# Patient Record
Sex: MALE | Race: WHITE | NOT HISPANIC OR LATINO
[De-identification: names, ages, dates, MRNs, and addresses within clinical notes are randomized per-mention and may not be internally consistent; named-entity substitution may affect disease eponyms.]

---

## 2022-01-21 ENCOUNTER — TRANSCRIPTION ENCOUNTER (OUTPATIENT)
Age: 57
End: 2022-01-21

## 2023-02-21 ENCOUNTER — OUTPATIENT (OUTPATIENT)
Dept: OUTPATIENT SERVICES | Facility: HOSPITAL | Age: 58
LOS: 1 days | End: 2023-02-21
Payer: COMMERCIAL

## 2023-02-21 DIAGNOSIS — F33.1 MAJOR DEPRESSIVE DISORDER, RECURRENT, MODERATE: ICD-10-CM

## 2023-02-21 DIAGNOSIS — I10 ESSENTIAL (PRIMARY) HYPERTENSION: ICD-10-CM

## 2023-02-21 DIAGNOSIS — G47.33 OBSTRUCTIVE SLEEP APNEA (ADULT) (PEDIATRIC): ICD-10-CM

## 2023-02-21 PROBLEM — Z00.00 ENCOUNTER FOR PREVENTIVE HEALTH EXAMINATION: Status: ACTIVE | Noted: 2023-02-21

## 2023-02-21 PROCEDURE — 80061 LIPID PANEL: CPT

## 2023-02-21 PROCEDURE — 84443 ASSAY THYROID STIM HORMONE: CPT

## 2023-02-21 PROCEDURE — 83036 HEMOGLOBIN GLYCOSYLATED A1C: CPT

## 2023-02-21 PROCEDURE — 85027 COMPLETE CBC AUTOMATED: CPT

## 2023-02-21 PROCEDURE — 80053 COMPREHEN METABOLIC PANEL: CPT

## 2023-02-21 PROCEDURE — 84153 ASSAY OF PSA TOTAL: CPT

## 2023-02-21 PROCEDURE — 36415 COLL VENOUS BLD VENIPUNCTURE: CPT

## 2023-05-10 ENCOUNTER — NON-APPOINTMENT (OUTPATIENT)
Age: 58
End: 2023-05-10

## 2023-05-10 ENCOUNTER — TRANSCRIPTION ENCOUNTER (OUTPATIENT)
Age: 58
End: 2023-05-10

## 2023-05-10 ENCOUNTER — APPOINTMENT (OUTPATIENT)
Dept: CARDIOLOGY | Facility: CLINIC | Age: 58
End: 2023-05-10
Payer: COMMERCIAL

## 2023-05-10 VITALS
HEIGHT: 71 IN | HEART RATE: 48 BPM | WEIGHT: 315 LBS | SYSTOLIC BLOOD PRESSURE: 104 MMHG | DIASTOLIC BLOOD PRESSURE: 63 MMHG | BODY MASS INDEX: 44.1 KG/M2 | OXYGEN SATURATION: 95 %

## 2023-05-10 DIAGNOSIS — Z78.9 OTHER SPECIFIED HEALTH STATUS: ICD-10-CM

## 2023-05-10 DIAGNOSIS — Z99.89 OBSTRUCTIVE SLEEP APNEA (ADULT) (PEDIATRIC): ICD-10-CM

## 2023-05-10 DIAGNOSIS — I49.3 VENTRICULAR PREMATURE DEPOLARIZATION: ICD-10-CM

## 2023-05-10 DIAGNOSIS — G47.33 OBSTRUCTIVE SLEEP APNEA (ADULT) (PEDIATRIC): ICD-10-CM

## 2023-05-10 PROCEDURE — 93000 ELECTROCARDIOGRAM COMPLETE: CPT

## 2023-05-10 PROCEDURE — 99205 OFFICE O/P NEW HI 60 MIN: CPT

## 2023-05-10 RX ORDER — FLUTICASONE FUROATE, UMECLIDINIUM BROMIDE AND VILANTEROL TRIFENATATE 100; 62.5; 25 UG/1; UG/1; UG/1
100-62.5-25 POWDER RESPIRATORY (INHALATION)
Refills: 0 | Status: ACTIVE | COMMUNITY
Start: 2023-05-10

## 2023-05-12 NOTE — DISCUSSION/SUMMARY
[FreeTextEntry1] : 57 year man with a history as listed presents for an initial cardiac evaluation. \par Fortunato is complaining of chronic MONTAG[place]. He was previously seeing Dr. Cruz. His symptoms are multifactorial in nature. Obesity is playing a large component of it. We spoke about possible GLP1 agonist in the future. \par He appears volume overloaded. I would change his lasix to torsemide. 20mg Q12 x 3 days and then down to 20mg Qday. He will get a 2d echo to assess for any  new structural heart disease, changes in valvular and ventricular function. \par Noted to have asymptomatic frequent PVCs. He had reportedly an negative ischemic evaluation within the last 2-3 years. I think that he will need a RHC and LHC. \par He will continue losartan\par Exercise and diet counseling was performed in order to reduce her future cardiovascular risk.\par He will followup with me in 1-2 months or sooner if necessary. \par  [EKG obtained to assist in diagnosis and management of assessed problem(s)] : EKG obtained to assist in diagnosis and management of assessed problem(s)

## 2023-05-12 NOTE — HISTORY OF PRESENT ILLNESS
[FreeTextEntry1] : 57 year old man with a history HTN, morbid obesity, right paralyzed hemidiaphragm, chronic lymphedema. HAKAN on CPAP presents for an initial cardiac evaluation. \par \par He was seeing Dr. Tadeo Cruz previously but not has changed insurance. He has been having long standing standing dyspnea on exertion.  He notes that his symptoms worsened 4 years ago after having COVID, PNA a couple of year ago. He notes that he walk about 100 feet and then has to stop 2/2 dyspnea. He notes mild chronic orthopnea. He   denies any chest pain, PND, near syncope, syncope, strokelike symptoms. His wife noted increase in abdominal girth. \par Medication reconciliation performed. He is compliant with his medications. \par \par

## 2023-05-12 NOTE — PHYSICAL EXAM
[Normal] : normal venous pressure, no carotid bruit [Normal Rate] : normal [Rhythm Regular] : regular [Normal S1] : normal S1 [Normal S2] : normal S2 [Distant] : the heart sounds were distant [No Murmur] : no murmurs heard [___ +] : bilateral [unfilled]U+ pretibial pitting edema [Rt] : varicose veins of the right leg noted [Lt] : varicose veins of the left leg noted [2+] : left 2+ [Clear Lung Fields] : clear lung fields [Good Air Entry] : good air entry [No Respiratory Distress] : no respiratory distress  [Soft] : abdomen soft [Non Tender] : non-tender [Normal Gait] : normal gait [No Cyanosis] : no cyanosis [No Clubbing] : no clubbing [No Rash] : no rash [No Skin Lesions] : no skin lesions [Moves all extremities] : moves all extremities [No Focal Deficits] : no focal deficits [Alert and Oriented] : alert and oriented [Normal memory] : normal memory [Right Carotid Bruit] : no bruit heard over the right carotid [Left Carotid Bruit] : no bruit heard over the left carotid

## 2023-05-12 NOTE — CARDIOLOGY SUMMARY
[de-identified] : Sinus  Rhythm  -Frequent pvcs -ventricular trigeminy \par -  Nonspecific T-abnormality. \par  Low voltage with rightward P-axis and rotation -possible pulmonary disease. \par  [de-identified] : 2021 ([facility]) small defect presents in the mid anterolateral will may be 2/2 artifact but ischemia cannot be ruled

## 2023-05-16 ENCOUNTER — APPOINTMENT (OUTPATIENT)
Dept: CARDIOLOGY | Facility: CLINIC | Age: 58
End: 2023-05-16
Payer: COMMERCIAL

## 2023-05-16 ENCOUNTER — MED ADMIN CHARGE (OUTPATIENT)
Age: 58
End: 2023-05-16

## 2023-05-16 PROCEDURE — 93306 TTE W/DOPPLER COMPLETE: CPT

## 2023-05-16 RX ORDER — PERFLUTREN 6.52 MG/ML
6.52 INJECTION, SUSPENSION INTRAVENOUS
Qty: 1 | Refills: 0 | Status: COMPLETED | OUTPATIENT
Start: 2023-05-16

## 2023-05-16 RX ADMIN — PERFLUTREN MG/ML: 6.52 INJECTION, SUSPENSION INTRAVENOUS at 00:00

## 2023-06-13 ENCOUNTER — NON-APPOINTMENT (OUTPATIENT)
Age: 58
End: 2023-06-13

## 2023-06-14 ENCOUNTER — APPOINTMENT (OUTPATIENT)
Dept: CARDIOLOGY | Facility: CLINIC | Age: 58
End: 2023-06-14
Payer: COMMERCIAL

## 2023-06-14 ENCOUNTER — NON-APPOINTMENT (OUTPATIENT)
Age: 58
End: 2023-06-14

## 2023-06-14 VITALS
HEART RATE: 51 BPM | BODY MASS INDEX: 44.1 KG/M2 | SYSTOLIC BLOOD PRESSURE: 117 MMHG | DIASTOLIC BLOOD PRESSURE: 77 MMHG | HEIGHT: 71 IN | WEIGHT: 315 LBS | OXYGEN SATURATION: 97 %

## 2023-06-14 PROCEDURE — 99215 OFFICE O/P EST HI 40 MIN: CPT

## 2023-06-14 PROCEDURE — 93000 ELECTROCARDIOGRAM COMPLETE: CPT

## 2023-06-14 NOTE — HISTORY OF PRESENT ILLNESS
[FreeTextEntry1] : 57 year old man with a history HTN, morbid obesity, right paralyzed hemidiaphragm, chronic lymphedema. HAKAN on CPAP presents for an initial cardiac evaluation. \par \par Since his last visit, he is feeling much better. He notes that his exercise tolerance has increased. He is able to able to walk about 150 feet and his recovery has improved. . He notes mild chronic orthopnea. He   denies any chest pain, PND, near syncope, syncope, strokelike symptoms. he has noted a mild decrease in abdominal girth. \par Medication reconciliation performed. He is compliant with his medications. \par \par

## 2023-06-14 NOTE — DISCUSSION/SUMMARY
[FreeTextEntry1] : 58 year man with a history as listed [facility]. \par Fortunato feels like he has dramatically improved on the torsemide. He is taking it in the afternoon because of brisk diuresis. He will try to take the Torsemide 20mg q12 three times a week. \par Noted to have asymptomatic frequent PVCs which has resolved.  Given is new HF he will need a RHC and LHC. \par  Obesity is playing a large component of it. We spoke about possible GLP1 agonist in the future. \par He appears volume overloaded.  \par \par He will continue losartan\par Exercise and diet counseling was performed in order to reduce her future cardiovascular risk.\par He will followup with me in 2 months or sooner if necessary. \par  [EKG obtained to assist in diagnosis and management of assessed problem(s)] : EKG obtained to assist in diagnosis and management of assessed problem(s)

## 2023-06-14 NOTE — CARDIOLOGY SUMMARY
[de-identified] : Sinus  Bradycardia \par Low voltage with rightward P-axis and rotation -possible pulmonary disease.\par  [de-identified] : 2021 ([facility]) small defect presents in the mid anterolateral will may be 2/2 artifact but ischemia cannot be ruled  [de-identified] : 5/17/23 normal LV function, mild LVH

## 2023-06-30 ENCOUNTER — TRANSCRIPTION ENCOUNTER (OUTPATIENT)
Age: 58
End: 2023-06-30

## 2023-07-07 ENCOUNTER — TRANSCRIPTION ENCOUNTER (OUTPATIENT)
Age: 58
End: 2023-07-07

## 2023-07-07 ENCOUNTER — INPATIENT (INPATIENT)
Facility: HOSPITAL | Age: 58
LOS: 0 days | Discharge: HOME | DRG: 247 | End: 2023-07-08
Attending: STUDENT IN AN ORGANIZED HEALTH CARE EDUCATION/TRAINING PROGRAM | Admitting: STUDENT IN AN ORGANIZED HEALTH CARE EDUCATION/TRAINING PROGRAM
Payer: COMMERCIAL

## 2023-07-07 VITALS
RESPIRATION RATE: 26 BRPM | OXYGEN SATURATION: 88 % | SYSTOLIC BLOOD PRESSURE: 114 MMHG | TEMPERATURE: 98 F | DIASTOLIC BLOOD PRESSURE: 63 MMHG | HEART RATE: 55 BPM | HEIGHT: 70 IN | WEIGHT: 315 LBS

## 2023-07-07 DIAGNOSIS — I50.9 HEART FAILURE, UNSPECIFIED: ICD-10-CM

## 2023-07-07 DIAGNOSIS — Z87.898 PERSONAL HISTORY OF OTHER SPECIFIED CONDITIONS: ICD-10-CM

## 2023-07-07 DIAGNOSIS — I10 ESSENTIAL (PRIMARY) HYPERTENSION: ICD-10-CM

## 2023-07-07 DIAGNOSIS — Z90.49 ACQUIRED ABSENCE OF OTHER SPECIFIED PARTS OF DIGESTIVE TRACT: Chronic | ICD-10-CM

## 2023-07-07 DIAGNOSIS — E66.01 MORBID (SEVERE) OBESITY DUE TO EXCESS CALORIES: ICD-10-CM

## 2023-07-07 DIAGNOSIS — I89.0 LYMPHEDEMA, NOT ELSEWHERE CLASSIFIED: ICD-10-CM

## 2023-07-07 DIAGNOSIS — Z98.890 OTHER SPECIFIED POSTPROCEDURAL STATES: ICD-10-CM

## 2023-07-07 DIAGNOSIS — G47.33 OBSTRUCTIVE SLEEP APNEA (ADULT) (PEDIATRIC): ICD-10-CM

## 2023-07-07 DIAGNOSIS — Z29.9 ENCOUNTER FOR PROPHYLACTIC MEASURES, UNSPECIFIED: ICD-10-CM

## 2023-07-07 LAB
ANION GAP SERPL CALC-SCNC: 6 MMOL/L — SIGNIFICANT CHANGE UP (ref 5–17)
BUN SERPL-MCNC: 19 MG/DL — SIGNIFICANT CHANGE UP (ref 7–23)
CALCIUM SERPL-MCNC: 9 MG/DL — SIGNIFICANT CHANGE UP (ref 8.5–10.1)
CHLORIDE SERPL-SCNC: 107 MMOL/L — SIGNIFICANT CHANGE UP (ref 96–108)
CO2 SERPL-SCNC: 28 MMOL/L — SIGNIFICANT CHANGE UP (ref 22–31)
CREAT SERPL-MCNC: 1.1 MG/DL — SIGNIFICANT CHANGE UP (ref 0.5–1.3)
EGFR: 78 ML/MIN/1.73M2 — SIGNIFICANT CHANGE UP
GLUCOSE SERPL-MCNC: 117 MG/DL — HIGH (ref 70–99)
HCT VFR BLD CALC: 44.1 % — SIGNIFICANT CHANGE UP (ref 39–50)
HGB BLD-MCNC: 14.7 G/DL — SIGNIFICANT CHANGE UP (ref 13–17)
MCHC RBC-ENTMCNC: 29.8 PG — SIGNIFICANT CHANGE UP (ref 27–34)
MCHC RBC-ENTMCNC: 33.3 GM/DL — SIGNIFICANT CHANGE UP (ref 32–36)
MCV RBC AUTO: 89.3 FL — SIGNIFICANT CHANGE UP (ref 80–100)
NRBC # BLD: 0 /100 WBCS — SIGNIFICANT CHANGE UP (ref 0–0)
PLATELET # BLD AUTO: 145 K/UL — LOW (ref 150–400)
POTASSIUM SERPL-MCNC: 3.8 MMOL/L — SIGNIFICANT CHANGE UP (ref 3.5–5.3)
POTASSIUM SERPL-SCNC: 3.8 MMOL/L — SIGNIFICANT CHANGE UP (ref 3.5–5.3)
RBC # BLD: 4.94 M/UL — SIGNIFICANT CHANGE UP (ref 4.2–5.8)
RBC # FLD: 13.1 % — SIGNIFICANT CHANGE UP (ref 10.3–14.5)
SODIUM SERPL-SCNC: 141 MMOL/L — SIGNIFICANT CHANGE UP (ref 135–145)
WBC # BLD: 9.05 K/UL — SIGNIFICANT CHANGE UP (ref 3.8–10.5)
WBC # FLD AUTO: 9.05 K/UL — SIGNIFICANT CHANGE UP (ref 3.8–10.5)

## 2023-07-07 PROCEDURE — 93571 IV DOP VEL&/PRESS C FLO 1ST: CPT | Mod: 26,52,LD

## 2023-07-07 PROCEDURE — 99152 MOD SED SAME PHYS/QHP 5/>YRS: CPT

## 2023-07-07 PROCEDURE — 92978 ENDOLUMINL IVUS OCT C 1ST: CPT | Mod: 26,LD

## 2023-07-07 PROCEDURE — 92928 PRQ TCAT PLMT NTRAC ST 1 LES: CPT | Mod: LD

## 2023-07-07 PROCEDURE — 93460 R&L HRT ART/VENTRICLE ANGIO: CPT | Mod: 26,59

## 2023-07-07 PROCEDURE — 93010 ELECTROCARDIOGRAM REPORT: CPT | Mod: 76

## 2023-07-07 RX ORDER — LOSARTAN POTASSIUM 100 MG/1
100 TABLET, FILM COATED ORAL DAILY
Refills: 0 | Status: DISCONTINUED | OUTPATIENT
Start: 2023-07-07 | End: 2023-07-08

## 2023-07-07 RX ORDER — CLOPIDOGREL BISULFATE 75 MG/1
75 TABLET, FILM COATED ORAL
Refills: 0 | Status: DISCONTINUED | OUTPATIENT
Start: 2023-07-08 | End: 2023-07-08

## 2023-07-07 RX ORDER — ASPIRIN/CALCIUM CARB/MAGNESIUM 324 MG
81 TABLET ORAL
Refills: 0 | Status: DISCONTINUED | OUTPATIENT
Start: 2023-07-08 | End: 2023-07-08

## 2023-07-07 RX ORDER — FUROSEMIDE 40 MG
40 TABLET ORAL ONCE
Refills: 0 | Status: COMPLETED | OUTPATIENT
Start: 2023-07-07 | End: 2023-07-07

## 2023-07-07 RX ORDER — FLUTICASONE FUROATE, UMECLIDINIUM BROMIDE AND VILANTEROL TRIFENATATE 200; 62.5; 25 UG/1; UG/1; UG/1
1 POWDER RESPIRATORY (INHALATION)
Refills: 0 | DISCHARGE

## 2023-07-07 RX ORDER — LOSARTAN POTASSIUM 100 MG/1
1 TABLET, FILM COATED ORAL
Refills: 0 | DISCHARGE

## 2023-07-07 RX ORDER — ASPIRIN/CALCIUM CARB/MAGNESIUM 324 MG
1 TABLET ORAL
Qty: 90 | Refills: 3
Start: 2023-07-07 | End: 2024-06-30

## 2023-07-07 RX ORDER — ATORVASTATIN CALCIUM 80 MG/1
1 TABLET, FILM COATED ORAL
Qty: 90 | Refills: 3
Start: 2023-07-07 | End: 2024-06-30

## 2023-07-07 RX ORDER — CLOPIDOGREL BISULFATE 75 MG/1
1 TABLET, FILM COATED ORAL
Qty: 90 | Refills: 3
Start: 2023-07-07 | End: 2024-06-30

## 2023-07-07 RX ORDER — ATORVASTATIN CALCIUM 80 MG/1
40 TABLET, FILM COATED ORAL AT BEDTIME
Refills: 0 | Status: DISCONTINUED | OUTPATIENT
Start: 2023-07-07 | End: 2023-07-08

## 2023-07-07 RX ADMIN — Medication 40 MILLIGRAM(S): at 15:43

## 2023-07-07 RX ADMIN — ATORVASTATIN CALCIUM 40 MILLIGRAM(S): 80 TABLET, FILM COATED ORAL at 21:10

## 2023-07-07 NOTE — CONSULT NOTE ADULT - SUBJECTIVE AND OBJECTIVE BOX
ANIL MCKNIGHT  58y  Male    Patient is a 58y old  Male referred for right and left heart catheterization. Patient presented for right and left heart catheterization to [facility] for pulm HTN and heart failure. He is feeling well and SOB has improved since he  was switched from lasix to torsemide. Patient went for right and left heart catheterization today,  S/P cardiac cath 7/7/23   Denies CP, SOB, palpitations, N/V, fever/chills, abd pain, numbness/tingling/weakness, other c/o at this time.        PAST MEDICAL/SURGICAL HISTORY  PAST MEDICAL & SURGICAL HISTORY:  HTN (hypertension)      Lymphedema of both lower extremities      Morbid obesity      Paralyzed hemidiaphragm      HAKAN on CPAP      Prediabetes      PNA (pneumonia)      History of cholecystectomy          REVIEW OF SYSTEMS:  CONSTITUTIONAL: No fever, weight loss, or fatigue  EYES: No eye pain, visual disturbances, or discharge  ENMT:  No difficulty hearing, tinnitus, vertigo; No sinus or throat pain  NECK: No pain or stiffness  BREASTS: No pain, masses, or nipple discharge  RESPIRATORY: No cough, wheezing, chills or hemoptysis; No shortness of breath  CARDIOVASCULAR: No chest pain, palpitations, dizziness, or leg swelling  GASTROINTESTINAL: No abdominal or epigastric pain. No nausea, vomiting, or hematemesis; No diarrhea or constipation. No melena or hematochezia.  GENITOURINARY: No dysuria, frequency, hematuria, or incontinence  NEUROLOGICAL: No headaches, memory loss, loss of strength, numbness, or tremors  SKIN: No itching, burning, rashes, or lesions   MUSCULOSKELETAL: No joint pain or swelling; No muscle, back, or extremity pain  PSYCHIATRIC: No depression, anxiety, mood swings, or difficulty sleeping        T(C): 36.7 (07-07-23 @ 11:02), Max: 36.7 (07-07-23 @ 11:02)  HR: 55 (07-07-23 @ 11:02) (55 - 55)  BP: 114/63 (07-07-23 @ 11:02) (114/63 - 114/63)  RR: 26 (07-07-23 @ 11:02) (26 - 26)  SpO2: 88% (07-07-23 @ 11:02) (88% - 88%)  Wt(kg): --Vital Signs Last 24 Hrs  T(C): 36.7 (07 Jul 2023 11:02), Max: 36.7 (07 Jul 2023 11:02)  T(F): 98.1 (07 Jul 2023 11:02), Max: 98.1 (07 Jul 2023 11:02)  HR: 55 (07 Jul 2023 11:02) (55 - 55)  BP: 114/63 (07 Jul 2023 11:02) (114/63 - 114/63)  BP(mean): --  RR: 26 (07 Jul 2023 11:02) (26 - 26)  SpO2: 88% (07 Jul 2023 11:02) (88% - 88%)        PHYSICAL EXAM:  GENERAL: NAD, well-groomed, well-developed  HEAD:  Atraumatic, Normocephalic  EYES: EOMI, PERRLA, conjunctiva and sclera clear  ENMT: No tonsillar erythema, exudates, or enlargement; Moist mucous membranes, Good dentition, No lesions  NECK: Supple, No JVD, Normal thyroid  NERVOUS SYSTEM:  Alert & Oriented X3, Good concentration; Motor Strength 5/5 B/L upper and lower extremities; DTRs 2+ intact and symmetric  CHEST/LUNG: Clear to percussion bilaterally; No rales, rhonchi, wheezing, or rubs  HEART: Regular rate and rhythm; No murmurs, rubs, or gallops  ABDOMEN: Soft, Nontender, Nondistended; Bowel sounds present  EXTREMITIES:  2+ Peripheral Pulses, No clubbing, cyanosis, or edema  LYMPH: No lymphadenopathy noted  SKIN: No rashes or lesions    Consultant(s) Notes Reviewed:  [x ] YES  [ ] NO  Care Discussed with Consultants/Other Providers [ x] YES  [ ] NO    LABS:  CBC   07-07-23 @ 10:50  Hematcorit 44.1  Hemoglobin 14.7  Mean Cell Hemoglobin 29.8  Platelet Count-Automated 145  RBC Count 4.94  Red Cell Distrib Width 13.1  Wbc Count 9.05      BMP  07-07-23 @ 10:50  Anion Gap. Serum 6  Blood Urea Nitrogen,Serm 19  Calcium, Total Serum 9.0  Carbon Dioxide, Serum 28  Chloride, Serum 107  Creatinine, Serum 1.10  eGFR in  --  eGFR in Non Afican American --  Gloucose, serum 117  Potassium, Serum 3.8  Sodium, Serum 141    CMP  07-07-23 @ 10:50  Lorna Aminotransferase(ALT/SGPT)--  Albumin, Serum --  Alkaline Phosphatase, Serum --  Anion Gap, Serum 6  Aspartate Aminotransferase (AST/SGOT)--  Bilirubin Total, Serum --  Blood Urea Nitrogen, Serum 19  Calcium,Total Serum 9.0  Carbon Dioxide, Serum 28  Chloride, Serum 107  Creatinine, Serum 1.10  eGFR if  --  eGFR if Non African American --  Glucose, Serum 117  Potassium, Serum 3.8  Protein Total, Serum --  Sodium, Serum 141      PT/INR      Amylase/Lipase    RADIOLOGY & ADDITIONAL TESTS:      Imaging Personally Reviewed:  [ ] YES  [ ] NO ANIL MCKNIGHT  58y  Male    Patient is a 58y old  Male referred for right and left heart catheterization. Patient presented for right and left heart catheterization to [facility] for pulm HTN and heart failure. He is feeling well and SOB has improved since he  was switched from lasix to torsemide. Patient went for right and left heart catheterization today,  S/P cardiac cath 7/7/23 w/ EFRAÍN x1 to 70% mLAD, Lasix 40mg IV x 1 for LVEDP 35.     Denies CP, SOB, palpitations, N/V, fever/chills, abd pain, numbness/tingling/weakness, other c/o at this time.        PAST MEDICAL/SURGICAL HISTORY  PAST MEDICAL & SURGICAL HISTORY:  HTN (hypertension)      Lymphedema of both lower extremities      Morbid obesity      Paralyzed hemidiaphragm      HAKAN on CPAP      Prediabetes      PNA (pneumonia)      History of cholecystectomy          REVIEW OF SYSTEMS:  CONSTITUTIONAL: No fever, weight loss, or fatigue  EYES: No eye pain, visual disturbances, or discharge  ENMT:  No difficulty hearing, tinnitus, vertigo; No sinus or throat pain  NECK: No pain or stiffness  BREASTS: No pain, masses, or nipple discharge  RESPIRATORY: No cough, wheezing, chills or hemoptysis; No shortness of breath  CARDIOVASCULAR: No pain, palpitations, dizziness.  GASTROINTESTINAL: No abdominal or epigastric pain. No nausea, vomiting, or hematemesis; No diarrhea or constipation. No melena or hematochezia.  GENITOURINARY: No dysuria, frequency, hematuria, or incontinence  NEUROLOGICAL: No headaches, memory loss, loss of strength, numbness, or tremors  SKIN: No itching, burning, rashes, or lesions   MUSCULOSKELETAL: No joint pain or swelling; No muscle, back, or extremity pain  PSYCHIATRIC: No depression, anxiety, mood swings, or difficulty sleeping        T(C): 36.7 (07-07-23 @ 11:02), Max: 36.7 (07-07-23 @ 11:02)  HR: 55 (07-07-23 @ 11:02) (55 - 55)  BP: 114/63 (07-07-23 @ 11:02) (114/63 - 114/63)  RR: 26 (07-07-23 @ 11:02) (26 - 26)  SpO2: 88% (07-07-23 @ 11:02) (88% - 88%)  Wt(kg): --Vital Signs Last 24 Hrs  T(C): 36.7 (07 Jul 2023 11:02), Max: 36.7 (07 Jul 2023 11:02)  T(F): 98.1 (07 Jul 2023 11:02), Max: 98.1 (07 Jul 2023 11:02)  HR: 55 (07 Jul 2023 11:02) (55 - 55)  BP: 114/63 (07 Jul 2023 11:02) (114/63 - 114/63)  BP(mean): --  RR: 26 (07 Jul 2023 11:02) (26 - 26)  SpO2: 88% (07 Jul 2023 11:02) (88% - 88%)        PHYSICAL EXAM:  GENERAL: NAD, well-groomed, well-developed  HEAD:  Atraumatic, Normocephalic  EYES: EOMI, PERRLA, conjunctiva and sclera clear  ENMT: No tonsillar erythema, exudates, or enlargement; Moist mucous membranes, Good dentition, No lesions  NECK: Supple, No JVD, Normal thyroid  NERVOUS SYSTEM:  Alert & Oriented X3, Good concentration; Motor Strength 5/5 B/L upper and lower extremities; DTRs 2+ intact and symmetric  CHEST/LUNG: CTA bilaterally. Decrased BS BL 2/2 body habitus. No rales, rhonchi, wheezing, or rubs  HEART: Regular rate and rhythm; No murmurs, rubs, or gallops  ABDOMEN: Soft, Nontender, Nondistended; Bowel sounds present  EXTREMITIES:  +b/l lymphedema L>R. No clubbing or cyanosis    Consultant(s) Notes Reviewed:  [x ] YES  [ ] NO  Care Discussed with Consultants/Other Providers [ x] YES  [ ] NO    LABS:  CBC   07-07-23 @ 10:50  Hematcorit 44.1  Hemoglobin 14.7  Mean Cell Hemoglobin 29.8  Platelet Count-Automated 145  RBC Count 4.94  Red Cell Distrib Width 13.1  Wbc Count 9.05      BMP  07-07-23 @ 10:50  Anion Gap. Serum 6  Blood Urea Nitrogen,Serm 19  Calcium, Total Serum 9.0  Carbon Dioxide, Serum 28  Chloride, Serum 107  Creatinine, Serum 1.10  eGFR in  --  eGFR in Non Afican American --  Gloucose, serum 117  Potassium, Serum 3.8  Sodium, Serum 141    CMP  07-07-23 @ 10:50  Lorna Aminotransferase(ALT/SGPT)--  Albumin, Serum --  Alkaline Phosphatase, Serum --  Anion Gap, Serum 6  Aspartate Aminotransferase (AST/SGOT)--  Bilirubin Total, Serum --  Blood Urea Nitrogen, Serum 19  Calcium,Total Serum 9.0  Carbon Dioxide, Serum 28  Chloride, Serum 107  Creatinine, Serum 1.10  eGFR if  --  eGFR if Non African American --  Glucose, Serum 117  Potassium, Serum 3.8  Protein Total, Serum --  Sodium, Serum 141      PT/INR      Amylase/Lipase    RADIOLOGY & ADDITIONAL TESTS:      Imaging Personally Reviewed:  [ ] YES  [ ] NO ANIL MCKNIGHT  58y  Male    Patient is a 58y old  Male referred for right and left heart catheterization. Patient presented for right and left heart catheterization to [facility] for pulm HTN and heart failure. He is feeling well and SOB has improved since he was switched from lasix to torsemide. Patient went for right and left heart catheterization today,  S/P cardiac cath 7/7/23 w/ EFRAÍN x1 to 70% mLAD, Lasix 40mg IV x 1 for LVEDP 35.     Denies CP, SOB, palpitations, N/V, fever/chills, abd pain, numbness/tingling/weakness, other c/o at this time.        PAST MEDICAL/SURGICAL HISTORY  PAST MEDICAL & SURGICAL HISTORY:  HTN (hypertension)      Lymphedema of both lower extremities      Morbid obesity      Paralyzed hemidiaphragm      HAKAN on CPAP      Prediabetes      PNA (pneumonia)      History of cholecystectomy          REVIEW OF SYSTEMS:  CONSTITUTIONAL: No fever, weight loss, or fatigue  EYES: No eye pain, visual disturbances, or discharge  ENMT:  No difficulty hearing, tinnitus, vertigo; No sinus or throat pain  NECK: No pain or stiffness  BREASTS: No pain, masses, or nipple discharge  RESPIRATORY: No cough, wheezing, chills or hemoptysis; No shortness of breath  CARDIOVASCULAR: No pain, palpitations, dizziness.  GASTROINTESTINAL: No abdominal or epigastric pain. No nausea, vomiting, or hematemesis; No diarrhea or constipation. No melena or hematochezia.  GENITOURINARY: No dysuria, frequency, hematuria, or incontinence  NEUROLOGICAL: No headaches, memory loss, loss of strength, numbness, or tremors  SKIN: No itching, burning, rashes, or lesions   MUSCULOSKELETAL: No joint pain or swelling; No muscle, back, or extremity pain  PSYCHIATRIC: No depression, anxiety, mood swings, or difficulty sleeping        T(C): 36.7 (07-07-23 @ 11:02), Max: 36.7 (07-07-23 @ 11:02)  HR: 55 (07-07-23 @ 11:02) (55 - 55)  BP: 114/63 (07-07-23 @ 11:02) (114/63 - 114/63)  RR: 26 (07-07-23 @ 11:02) (26 - 26)  SpO2: 88% (07-07-23 @ 11:02) (88% - 88%)  Wt(kg): --Vital Signs Last 24 Hrs  T(C): 36.7 (07 Jul 2023 11:02), Max: 36.7 (07 Jul 2023 11:02)  T(F): 98.1 (07 Jul 2023 11:02), Max: 98.1 (07 Jul 2023 11:02)  HR: 55 (07 Jul 2023 11:02) (55 - 55)  BP: 114/63 (07 Jul 2023 11:02) (114/63 - 114/63)  BP(mean): --  RR: 26 (07 Jul 2023 11:02) (26 - 26)  SpO2: 88% (07 Jul 2023 11:02) (88% - 88%)        PHYSICAL EXAM:  GENERAL: NAD, well-groomed, well-developed  HEAD:  Atraumatic, Normocephalic  EYES: EOMI, PERRLA, conjunctiva and sclera clear  ENMT: No tonsillar erythema, exudates, or enlargement; Moist mucous membranes, Good dentition, No lesions  NECK: Supple, No JVD, Normal thyroid  NERVOUS SYSTEM:  Alert & Oriented X3, Good concentration; Motor Strength 5/5 B/L upper and lower extremities; DTRs 2+ intact and symmetric  CHEST/LUNG: CTA bilaterally. Decrased BS BL 2/2 body habitus. No rales, rhonchi, wheezing, or rubs  HEART: Regular rate and rhythm; No murmurs, rubs, or gallops  ABDOMEN: Soft, Nontender, Nondistended; Bowel sounds present  EXTREMITIES:  +b/l lymphedema L>R. No clubbing or cyanosis    Consultant(s) Notes Reviewed:  [x ] YES  [ ] NO  Care Discussed with Consultants/Other Providers [ x] YES  [ ] NO    LABS:  CBC   07-07-23 @ 10:50  Hematcorit 44.1  Hemoglobin 14.7  Mean Cell Hemoglobin 29.8  Platelet Count-Automated 145  RBC Count 4.94  Red Cell Distrib Width 13.1  Wbc Count 9.05      BMP  07-07-23 @ 10:50  Anion Gap. Serum 6  Blood Urea Nitrogen,Serm 19  Calcium, Total Serum 9.0  Carbon Dioxide, Serum 28  Chloride, Serum 107  Creatinine, Serum 1.10  eGFR in  --  eGFR in Non Afican American --  Gloucose, serum 117  Potassium, Serum 3.8  Sodium, Serum 141    CMP  07-07-23 @ 10:50  Lorna Aminotransferase(ALT/SGPT)--  Albumin, Serum --  Alkaline Phosphatase, Serum --  Anion Gap, Serum 6  Aspartate Aminotransferase (AST/SGOT)--  Bilirubin Total, Serum --  Blood Urea Nitrogen, Serum 19  Calcium,Total Serum 9.0  Carbon Dioxide, Serum 28  Chloride, Serum 107  Creatinine, Serum 1.10  eGFR if  --  eGFR if Non African American --  Glucose, Serum 117  Potassium, Serum 3.8  Protein Total, Serum --  Sodium, Serum 141      PT/INR      Amylase/Lipase    RADIOLOGY & ADDITIONAL TESTS:      Imaging Personally Reviewed:  [ ] YES  [ ] NO

## 2023-07-07 NOTE — H&P CARDIOLOGY - HISTORY OF PRESENT ILLNESS
58 year old male with no implantable cardiac devices and PMH chronic hereditary lymphedema, paralyzed R diaphragm, PNA, HTN, morbid obesity, HAKAN on CPAP, prediabetes (A1c 6.0).     6/14/23 seen by Dr Rojas Rodriguez:  Since his last visit, he is feeling much better. He notes that his exercise tolerance has increased. He is able to able to walk about 150 feet and his recovery has improved. . He notes mild chronic orthopnea. He denies any chest pain, PND, near syncope, syncope, strokelike symptoms. he has noted a mild decrease in abdominal girth. Medication reconciliation performed. He is compliant with his medications.      Cardiology Summary    ECG: Sinus Bradycardia   Low voltage with rightward P-axis and rotation -possible pulmonary disease.     Stress Test: 2021 ([facility]) small defect presents in the mid anterolateral will may be 2/2 artifact but ischemia cannot be ruled   Echo: 5/17/23 normal LV function, mild LVH      7/7/23 pt presents today for right and left heart catheterization to eval for pulm HTN and heart failure. He is feeling well and SOB has improved since he was switched from lasix to torsemide. Denies CP, SOB, palpitations, N/V, fever/chills, abd pain, numbness/tingling/weakness, other c/o at this time.    58 year old male with no implantable cardiac devices and PMH chronic hereditary lymphedema, paralyzed R diaphragm, PNA, HTN, morbid obesity, HAKAN on CPAP, prediabetes (A1c 6.0).     6/14/23 seen by Dr Rojas Rodriguez:  Since his last visit, he is feeling much better. He notes that his exercise tolerance has increased. He is able to able to walk about 150 feet and his recovery has improved. . He notes mild chronic orthopnea. He denies any chest pain, PND, near syncope, syncope, strokelike symptoms. he has noted a mild decrease in abdominal girth. Medication reconciliation performed. He is compliant with his medications.      Cardiology Summary    ECG: Sinus Bradycardia   Low voltage with rightward P-axis and rotation -possible pulmonary disease.     Stress Test: 2021 ([facility]) small defect presents in the mid anterolateral will may be 2/2 artifact but ischemia cannot be ruled   Echo: 5/17/23 normal LV function, mild LVH      7/7/23 pt presents today for right and left heart catheterization to eval for pulm HTN and heart failure. He is feeling well and SOB has improved since he was switched from lasix to torsemide. Denies CP, SOB, palpitations, N/V, fever/chills, abd pain, numbness/tingling/weakness, other c/o at this time.     Adjusted CathPCI Bleeding Event Risk: 0.7%

## 2023-07-07 NOTE — DISCHARGE NOTE PROVIDER - CARE PROVIDER_API CALL
Jade Urias  [address] [place] [id number]  Phone: [phone]  Fax: (   )    -  Scheduled Appointment: 07/14/2023    Rojas Rodriguez  Cardiology  [address] [place] [address]  Phone: [phone]  Fax: [phone]  Follow Up Time: 2 weeks   Rojas Rodriguez  Cardiology  [address] [place] [address]  Phone: [phone]  Fax: [phone]  Follow Up Time: 2 weeks    Jade Urias  [address] [place] [address]  Phone: [phone]  Fax: (   )    -  Scheduled Appointment: 07/14/2023 10:15 AM

## 2023-07-07 NOTE — H&P CARDIOLOGY - NSICDXPASTMEDICALHX_GEN_ALL_CORE_FT
PAST MEDICAL HISTORY:  HTN (hypertension)     Lymphedema of both lower extremities     Morbid obesity     HAKAN on CPAP     Paralyzed hemidiaphragm     PNA (pneumonia)     Prediabetes

## 2023-07-07 NOTE — DISCHARGE NOTE PROVIDER - NSDCCPTREATMENT_GEN_ALL_CORE_FT
PRINCIPAL PROCEDURE  Procedure: Left heart catheterization  Findings and Treatment:       SECONDARY PROCEDURE  Procedure: Right heart catheterization  Findings and Treatment:     Procedure: Insertion, stent, drug eluting, coronary artery, single  Findings and Treatment: mLAD 7/7/23 (70% stenosis, iFR 0.89%)

## 2023-07-07 NOTE — CONSULT NOTE ADULT - PROBLEM SELECTOR RECOMMENDATION 5
prediabetes (A1c 6.0)  blood sugar 117   continue monitor blood sugar prediabetes (A1c 6.0)  -blood sugar 117   -continue monitor blood sugar

## 2023-07-07 NOTE — CONSULT NOTE ADULT - ASSESSMENT
58 year old male with no implantable cardiac devices and PMH chronic hereditary lymphedema, paralyzed R diaphragm, PNA, HTN, morbid obesity, HAKAN on CPAP, prediabetes (A1c 6.0). s/p cardiac cath 7/723, here for post cath observation and close monitoring  58 year old male with no implantable cardiac devices and PMH chronic hereditary lymphedema, paralyzed R diaphragm, PNA, HTN, morbid obesity, HAKAN on CPAP, prediabetes (A1c 6.0). Patient s/p right and left heart catheterization s/p mLAD CAD, s/p EFRAÍN x 1 on 7/7/2023, admitted for post cath observation and close monitoring

## 2023-07-07 NOTE — DISCHARGE NOTE PROVIDER - PROVIDER TOKENS
FREE:[LAST:[[employer]],FIRST:[Jade],PHONE:[phone]],FAX:[(   )    -],ADDRESS:[[address] [place] [address]],SCHEDULEDAPPT:[07/14/2023]],PROVIDER:[TOKEN:[[id number]],FOLLOWUP:[2 weeks]] PROVIDER:[TOKEN:[[id number]],FOLLOWUP:[2 weeks]],FREE:[LAST:[Boutis],FIRST:[Jade],PHONE:[phone]],FAX:[(   )    -],ADDRESS:[[address] [place] [address]],SCHEDULEDAPPT:[07/14/2023],SCHEDULEDAPPTTIME:[10:15 AM]]

## 2023-07-07 NOTE — CHART NOTE - NSCHARTNOTEFT_GEN_A_CORE
Department of Cardiology                                                                     Division of Interventional Cardiology                                                                  [facility]/ [place] [address]                                                                             Telephone: [phone]                                                    Post- Procedure Note: Right & Left Heart Cardiac Catheterization       58y  Male is now s/p left heart catheterization    Subjective/ROS: no c/o offered  Denies CP, palpitations, SOB, N/V, fever/chills, dizziness, weakness, numbness/tingling.      HPI:  58 year old male with no implantable cardiac devices and PMH chronic hereditary lymphedema, paralyzed R diaphragm, PNA, HTN, morbid obesity, HAKAN on CPAP, prediabetes (A1c 6.0).     6/14/23 seen by Dr Rojas Rodriguez:  Since his last visit, he is feeling much better. He notes that his exercise tolerance has increased. He is able to able to walk about 150 feet and his recovery has improved. . He notes mild chronic orthopnea. He denies any chest pain, PND, near syncope, syncope, strokelike symptoms. he has noted a mild decrease in abdominal girth. Medication reconciliation performed. He is compliant with his medications.      Cardiology Summary    ECG: Sinus Bradycardia   Low voltage with rightward P-axis and rotation -possible pulmonary disease.     Stress Test: 2021 ([facility]) small defect presents in the mid anterolateral will may be 2/2 artifact but ischemia cannot be ruled   Echo: 5/17/23 normal LV function, mild LVH      7/7/23 pt presents today for right and left heart catheterization to eval for pulm HTN and heart failure. He is feeling well and SOB has improved since he was switched from lasix to torsemide. Denies CP, SOB, palpitations, N/V, fever/chills, abd pain, numbness/tingling/weakness, other c/o at this time.     Adjusted CathPCI Bleeding Event Risk: 0.7%    (07 Jul 2023 11:02)      PAST MEDICAL & SURGICAL HISTORY:  HTN (hypertension)  Lymphedema of both lower extremities  Morbid obesity  Paralyzed hemidiaphragm  HAKAN on CPAP  Prediabetes  PNA (pneumonia)  History of cholecystectomy      MEDICATIONS  (STANDING):  atorvastatin 40 milliGRAM(s) Oral at bedtime  losartan 100 milliGRAM(s) Oral daily    No Known Allergies                          14.7   9.05  )-----------( 145      ( 07 Jul 2023 10:50 )             44.1     07-07    141  |  107  |  19  ----------------------------<  117<H>  3.8   |  28  |  1.10    Ca    9.0      07 Jul 2023 10:50      Tele: sinus karyn 50s    Post PCI ECG: SB, no change from prior      Vital Signs Last 24 Hrs  T(C): 37 (07 Jul 2023 14:50), Max: 37 (07 Jul 2023 14:50)  T(F): 98.6 (07 Jul 2023 14:50), Max: 98.6 (07 Jul 2023 14:50)  HR: 52 (07 Jul 2023 15:20) (50 - 65)  BP: 153/70 (07 Jul 2023 15:20) (114/63 - 159/87)  BP(mean): --  RR: 23 (07 Jul 2023 15:20) (21 - 26)  SpO2: 94% (07 Jul 2023 15:20) (88% - 94%)    Parameters below as of 07 Jul 2023 15:20  Patient On (Oxygen Delivery Method): nasal cannula  O2 Flow (L/min): 2      Constitutional: NAD  Neuro: A+O x 3, non-focal, speech clear and intact  HEENT: NC/AT, PERRL, EOMI, anicteric sclerae, oral mucosa pink and moist  Neck: supple, no JVD  CV: bradycardic, regular rhythm, +S1S2, no murmurs or rub  Pulm/chest: lung sounds CTA and equal bilaterally, no accessory muscle use noted  Abd: soft, NT, ND, +BS, obese  Ext: GONZALEZ x 4, + BLE lymphedema with thickened and darkened skin pigmentation.  Access site: R wrist C/D/I/soft without hematoma (radial band and R brachial sheath in place), distal motor/neuro/circ intact. R radial pulse 2+.  Skin: warm, well perfused  Psych: calm, appropriate affect      A/P:  mLAD CAD, s/p EFRAÍN x 1.   elevated LVEDP, lasix 40 mg IV x 1  pulm HTN    Admit to CPU for overnight observation post PCI  post cath/PCI routine VS, access site, neuro-vascular monitoring and RUE post access precautions ordered  no post cath hydration d/t elevated LVEDP  Bedrest. May get OOB 30 minutes after radial band removed if wrist and hemodynamics remain stable   EKG post cath done  f/u labs and EKG in am  continue dual anti platelet therapy with aspirin AND clopidogrel   Pt education provided/reinforced re: importance of strict adherence to uninterrupted DAPT for minimum of 6-12 months (cardiologist will determine duration)  cardiac rehab info referral and communication to cardiac rehab to be sent by Dr Urias' office during follow up visit  continue ARB as taken prior to admission  atorvastatin 40 mg QHS added  may resume diet  Lifestyle modifications discussed to reduce cardiovascular risk factors including weight reduction, smoking cessation (referral provided if applicable), medication compliance, and routine follow up with Cardiologist to track your BMI, cholesterol, and glucose levels.   Discharge in am if stable  follow-up on 7/14 @ 1015am with Dr Urias for post PCI check  follow-up in 2 weeks with outpatient/referring cardiologist  continue home medication regimen as appropriate  wife to bring in trelegy inhaler and CPAP to use tonight  remainder of plan per primary team/non-interventional cardiology   above d/w hospitalist by me Department of Cardiology                                                                     Division of Interventional Cardiology                                                                  [facility]/ [address] [address]                                                                             Telephone: [phone]                                                    Post- Procedure Note: Right & Left Heart Cardiac Catheterization       58y  Male is now s/p right and left heart catheterization    Prelim cath report:  1. right radial artery and right brachial vein accessed  2. 70% mLAD stenosis, iFR 0.89%, s/p EFRAÍN x 1  3. pulm HTN w/ PAS 50s  4. elevated LVEDP (35)  aspirin 324 and plavix 600 mg loaded in cath lab  continue asa 81 and plavix 75 qd  statin added, continue losartan  official/full report to follow      Subjective/ROS: no c/o offered  Denies CP, palpitations, SOB, N/V, fever/chills, dizziness, weakness, numbness/tingling.      HPI:  58 year old male with no implantable cardiac devices and PMH chronic hereditary lymphedema, paralyzed R diaphragm, PNA, HTN, morbid obesity, HAKAN on CPAP, prediabetes (A1c 6.0).     6/14/23 seen by Dr Rojas Rodriguez:  Since his last visit, he is feeling much better. He notes that his exercise tolerance has increased. He is able to able to walk about 150 feet and his recovery has improved. . He notes mild chronic orthopnea. He denies any chest pain, PND, near syncope, syncope, strokelike symptoms. he has noted a mild decrease in abdominal girth. Medication reconciliation performed. He is compliant with his medications.      Cardiology Summary    ECG: Sinus Bradycardia   Low voltage with rightward P-axis and rotation -possible pulmonary disease.     Stress Test: 2021 ([facility]) small defect presents in the mid anterolateral will may be 2/2 artifact but ischemia cannot be ruled   Echo: 5/17/23 normal LV function, mild LVH      7/7/23 pt presents today for right and left heart catheterization to eval for pulm HTN and heart failure. He is feeling well and SOB has improved since he was switched from lasix to torsemide. Denies CP, SOB, palpitations, N/V, fever/chills, abd pain, numbness/tingling/weakness, other c/o at this time.     Adjusted CathPCI Bleeding Event Risk: 0.7%    (07 Jul 2023 11:02)      PAST MEDICAL & SURGICAL HISTORY:  HTN (hypertension)  Lymphedema of both lower extremities  Morbid obesity  Paralyzed hemidiaphragm  HAKAN on CPAP  Prediabetes  PNA (pneumonia)  History of cholecystectomy      MEDICATIONS  (STANDING):  atorvastatin 40 milliGRAM(s) Oral at bedtime  losartan 100 milliGRAM(s) Oral daily    No Known Allergies                          14.7   9.05  )-----------( 145      ( 07 Jul 2023 10:50 )             44.1     07-07    141  |  107  |  19  ----------------------------<  117<H>  3.8   |  28  |  1.10    Ca    9.0      07 Jul 2023 10:50      Tele: sinus karyn 50s    Post PCI ECG: SB, no change from prior      Vital Signs Last 24 Hrs  T(C): 37 (07 Jul 2023 14:50), Max: 37 (07 Jul 2023 14:50)  T(F): 98.6 (07 Jul 2023 14:50), Max: 98.6 (07 Jul 2023 14:50)  HR: 52 (07 Jul 2023 15:20) (50 - 65)  BP: 153/70 (07 Jul 2023 15:20) (114/63 - 159/87)  BP(mean): --  RR: 23 (07 Jul 2023 15:20) (21 - 26)  SpO2: 94% (07 Jul 2023 15:20) (88% - 94%)    Parameters below as of 07 Jul 2023 15:20  Patient On (Oxygen Delivery Method): nasal cannula  O2 Flow (L/min): 2      Constitutional: NAD  Neuro: A+O x 3, non-focal, speech clear and intact  HEENT: NC/AT, PERRL, EOMI, anicteric sclerae, oral mucosa pink and moist  Neck: supple, no JVD  CV: bradycardic, regular rhythm, +S1S2, no murmurs or rub  Pulm/chest: lung sounds CTA and equal bilaterally, no accessory muscle use noted  Abd: soft, NT, ND, +BS, obese  Ext: GONZALEZ x 4, + BLE lymphedema with thickened and darkened skin pigmentation.  Access site: R wrist C/D/I/soft without hematoma (radial band and R brachial sheath in place), distal motor/neuro/circ intact. R radial pulse 2+.  Skin: warm, well perfused  Psych: calm, appropriate affect      A/P:  mLAD CAD, s/p EFRAÍN x 1.   elevated LVEDP, lasix 40 mg IV x 1  pulm HTN    Admit to CPU for overnight observation post PCI  post cath/PCI routine VS, access site, neuro-vascular monitoring and RUE post access precautions ordered  no post cath hydration d/t elevated LVEDP  Bedrest. May get OOB 30 minutes after radial band removed if wrist and hemodynamics remain stable   EKG post cath done  f/u labs and EKG in am  continue dual anti platelet therapy with aspirin AND clopidogrel   Pt education provided/reinforced re: importance of strict adherence to uninterrupted DAPT for minimum of 6-12 months (cardiologist will determine duration)  cardiac rehab info referral and communication to cardiac rehab to be sent by Dr Urias' office during follow up visit  continue ARB as taken prior to admission  atorvastatin 40 mg QHS added  may resume diet  Lifestyle modifications discussed to reduce cardiovascular risk factors including weight reduction, smoking cessation (referral provided if applicable), medication compliance, and routine follow up with Cardiologist to track your BMI, cholesterol, and glucose levels.   Discharge in am if stable  follow-up on 7/14 @ 1015am with Dr Urias for post PCI check  follow-up in 2 weeks with outpatient/referring cardiologist  continue home medication regimen as appropriate  wife to bring in trelegy inhaler and CPAP to use tonight  remainder of plan per primary team/non-interventional cardiology   above d/w hospitalist by me Department of Cardiology                                                                     Division of Interventional Cardiology                                                                  [facility]/ [address] [address]                                                                             Telephone: [phone]                                                    Post- Procedure Note: Right & Left Heart Cardiac Catheterization       58y  Male is now s/p right and left heart catheterization    Prelim cath report:  1. right radial artery and right brachial vein accessed  2. 70% mLAD stenosis, iFR 0.89%, s/p EFRAÍN x 1  3. pulm HTN w/ PAS 50s  4. elevated LVEDP (35)  aspirin 324 and plavix 600 mg loaded in cath lab  continue asa 81 and plavix 75 qd  statin added, continue losartan  official/full report to follow      Subjective/ROS: no c/o offered  Denies CP, palpitations, SOB, N/V, fever/chills, dizziness, weakness, numbness/tingling.      HPI:  58 year old male with no implantable cardiac devices and PMH chronic hereditary lymphedema, paralyzed R diaphragm, PNA, HTN, morbid obesity, HAKAN on CPAP, prediabetes (A1c 6.0).     6/14/23 seen by Dr Rojas Rodriguez:  Since his last visit, he is feeling much better. He notes that his exercise tolerance has increased. He is able to able to walk about 150 feet and his recovery has improved. . He notes mild chronic orthopnea. He denies any chest pain, PND, near syncope, syncope, strokelike symptoms. he has noted a mild decrease in abdominal girth. Medication reconciliation performed. He is compliant with his medications.      Cardiology Summary    ECG: Sinus Bradycardia   Low voltage with rightward P-axis and rotation -possible pulmonary disease.     Stress Test: 2021 ([facility]) small defect presents in the mid anterolateral will may be 2/2 artifact but ischemia cannot be ruled   Echo: 5/17/23 normal LV function, mild LVH      7/7/23 pt presents today for right and left heart catheterization to eval for pulm HTN and heart failure. He is feeling well and SOB has improved since he was switched from lasix to torsemide. Denies CP, SOB, palpitations, N/V, fever/chills, abd pain, numbness/tingling/weakness, other c/o at this time.     Adjusted CathPCI Bleeding Event Risk: 0.7%    (07 Jul 2023 11:02)      PAST MEDICAL & SURGICAL HISTORY:  HTN (hypertension)  Lymphedema of both lower extremities  Morbid obesity  Paralyzed hemidiaphragm  HAKAN on CPAP  Prediabetes  PNA (pneumonia)  History of cholecystectomy      MEDICATIONS  (STANDING):  atorvastatin 40 milliGRAM(s) Oral at bedtime  losartan 100 milliGRAM(s) Oral daily    No Known Allergies                          14.7   9.05  )-----------( 145      ( 07 Jul 2023 10:50 )             44.1     07-07    141  |  107  |  19  ----------------------------<  117<H>  3.8   |  28  |  1.10    Ca    9.0      07 Jul 2023 10:50      Tele: sinus karyn 50s    Post PCI ECG: SB, no change from prior      Vital Signs Last 24 Hrs  T(C): 37 (07 Jul 2023 14:50), Max: 37 (07 Jul 2023 14:50)  T(F): 98.6 (07 Jul 2023 14:50), Max: 98.6 (07 Jul 2023 14:50)  HR: 52 (07 Jul 2023 15:20) (50 - 65)  BP: 153/70 (07 Jul 2023 15:20) (114/63 - 159/87)  BP(mean): --  RR: 23 (07 Jul 2023 15:20) (21 - 26)  SpO2: 94% (07 Jul 2023 15:20) (88% - 94%)    Parameters below as of 07 Jul 2023 15:20  Patient On (Oxygen Delivery Method): nasal cannula  O2 Flow (L/min): 2      Constitutional: NAD  Neuro: A+O x 3, non-focal, speech clear and intact  HEENT: NC/AT, PERRL, EOMI, anicteric sclerae, oral mucosa pink and moist  Neck: supple, no JVD  CV: bradycardic, regular rhythm, +S1S2, no murmurs or rub  Pulm/chest: lung sounds CTA and equal bilaterally, no accessory muscle use noted  Abd: soft, NT, ND, +BS, obese  Ext: GONZALEZ x 4, + BLE lymphedema with thickened and darkened skin pigmentation.  Access site: R wrist C/D/I/soft without hematoma (radial band and R brachial sheath in place), distal motor/neuro/circ intact. R radial pulse 2+.  Skin: warm, well perfused  Psych: calm, appropriate affect      A/P:  mLAD CAD, s/p EFRAÍN x 1.   elevated LVEDP, lasix 40 mg IV x 1  pulm HTN    Admit to CPU for overnight observation post PCI  post cath/PCI routine VS, access site, neuro-vascular monitoring and RUE post access precautions ordered  no post cath hydration d/t elevated LVEDP  Bedrest. May get OOB 30 minutes after radial band removed if wrist and hemodynamics remain stable   EKG post cath done  f/u labs and EKG in am  continue dual anti platelet therapy with aspirin AND clopidogrel   aspirin and plavix prescriptions sent to and received by [facility]. confirmed w/ pharmacy   Pt education provided/reinforced re: importance of strict adherence to uninterrupted DAPT for minimum of 6-12 months (cardiologist will determine duration)  cardiac rehab info referral and communication to cardiac rehab to be sent by Dr Urias' office during follow up visit  continue ARB as taken prior to admission  atorvastatin 40 mg QHS added  may resume diet  Lifestyle modifications discussed to reduce cardiovascular risk factors including weight reduction, smoking cessation (referral provided if applicable), medication compliance, and routine follow up with Cardiologist to track your BMI, cholesterol, and glucose levels.   Discharge in am if stable  follow-up on 7/14 @ 1015am with Dr Urias for post PCI check  follow-up in 2 weeks with outpatient/referring cardiologist  continue home medication regimen as appropriate  wife to bring in trelegy inhaler and CPAP to use tonight  remainder of plan per primary team/non-interventional cardiology   above d/w hospitalist by me

## 2023-07-07 NOTE — DISCHARGE NOTE PROVIDER - NSDCFUSCHEDAPPT_GEN_ALL_CORE_FT
Rojas Rodriguez  [facility]  CARDIOLOGY [address] [facility]  Scheduled Appointment: 08/15/2023     Jade Urias  [facility]  CARDIOLOGY [address]  Scheduled Appointment: 07/14/2023    Rojas Rodriguez  [facility]  CARDIOLOGY [address] [facility]  Scheduled Appointment: 08/15/2023

## 2023-07-07 NOTE — DISCHARGE NOTE PROVIDER - NSDCMRMEDTOKEN_GEN_ALL_CORE_FT
losartan:   potassium:   torsemide 20 mg oral tablet: 1 orally once a day  trefabien balbuenata:    Aspirin Enteric Coated 81 mg oral delayed release tablet: 1 tab(s) orally once a day  clopidogrel 75 mg oral tablet: 1 tab(s) orally once a day  losartan:   losartan 100 mg oral tablet: 1 orally once a day  potassium:   potassium chloride 10 mEq oral tablet, extended release: 1 orally once a day  torsemide 20 mg oral tablet: 1 orally once a day  trelegy ellipta:   Trelegy Ellipta 100 mcg-62.5 mcg-25 mcg/inh inhalation powder: 1 inhaled once a day   Aspirin Enteric Coated 81 mg oral delayed release tablet: 1 tab(s) orally once a day  atorvastatin 40 mg oral tablet: 1 tab(s) orally once a day (at bedtime)  clopidogrel 75 mg oral tablet: 1 tab(s) orally once a day  losartan 100 mg oral tablet: 1 tab(s) orally once a day  potassium chloride 10 mEq oral tablet, extended release: 1 tab(s) orally once a day  torsemide 20 mg oral tablet: 1 tablet orally once a day  Trelegy Ellipta 100 mcg-62.5 mcg-25 mcg/inh inhalation powder: 1 inhaled once a day

## 2023-07-07 NOTE — ASU PATIENT PROFILE, ADULT - MEDICATION ADMINISTRATION INFO, PROFILE
0715 report received from [address] [facility]. gtts verified. TOF checked. No titration needed 2/4 twitches on a setting of 3. Gown changed, oral care performed, patient turned. [facility] arrived at 0740 and hooked patient to their equipment. Patient discharged and transported to [facility] via [facility]. Emtala completed and signed by house supervisor. no concerns

## 2023-07-07 NOTE — DISCHARGE NOTE PROVIDER - NSDCCPCAREPLAN_GEN_ALL_CORE_FT
PRINCIPAL DISCHARGE DIAGNOSIS  Diagnosis: CAD (coronary artery disease)  Assessment and Plan of Treatment:      PRINCIPAL DISCHARGE DIAGNOSIS  Diagnosis: CAD (coronary artery disease)  Assessment and Plan of Treatment: s/p elective R+L heart cath via R radial artery and R brachial vein. s/p EFRAÍN x 1 to 70% mLAD stenosis. continue medication regiment as prescribed, follow up cardiology.

## 2023-07-07 NOTE — CONSULT NOTE ADULT - PROBLEM SELECTOR RECOMMENDATION 2
- c/w home     w/ holding parameters  - will monitor cmp am  - DASH/TLC  - monitor BP & titrate BP meds PRN. - c/w home losartan w/ holding parameters  - will monitor cmp am  - DASH/TLC  - monitor BP & titrate BP meds PRN.

## 2023-07-07 NOTE — CONSULT NOTE ADULT - PROBLEM SELECTOR RECOMMENDATION 9
- patient s/p right and left heart catheterization 7/7/23  - ECG: Sinus Bradycardia Low voltage with rightward P-axis and rotation -possible pulmonary disease.   - Stress Test: 2021 ([facility]) small defect presents in the mid anterolateral will may be 2/2 artifact but ischemia cannot be ruled   - Echo: 5/17/23 normal LV function, mild LVH    - plan:   - Follow cardio out patient  - patient s/p right and left heart catheterization 7/7/23 w/ EFRAÍN x1 to 70% mLAD  - ECG: Sinus Bradycardia Low voltage with rightward P-axis and rotation -possible pulmonary disease.   - Stress Test: 2021 ([facility]) small defect presents in the mid anterolateral will may be 2/2 artifact but ischemia cannot be ruled   - Echo: 5/17/23 normal LV function, mild LVH    - plan: ASA 81mg , clopidogrel, lasix 40mg IV x1, atorvastatin 40mg   - Follow cardio out patient  - patient s/p right and left heart catheterization 7/7/23 w/ EFRAÍN x1 to 70% mLAD  - ECG: Sinus Bradycardia Low voltage with rightward P-axis and rotation -possible pulmonary disease.   - Stress Test: 2021 ([facility]) small defect presents in the mid anterolateral will may be 2/2 artifact but ischemia cannot be ruled   - Echo: 5/17/23 normal LV function, mild LVH  - atorvastatin 40mg added    - aspirin 324 and plavix 600 mg loaded in cath lab  - c/w asa 81 and plavix 75 qd  - plan: ASA 81mg , clopidogrel, lasix 40mg IV x1  - Follow cardio out patient Dr Rodriguez - patient s/p right and left heart catheterization 7/7/23 w/ EFRAÍN x1 to 70% mLAD  - ECG: Sinus Bradycardia Low voltage with rightward P-axis and rotation -possible pulmonary disease.   - Stress Test: 2021 ([facility]) small defect presents in the mid anterolateral will may be 2/2 artifact but ischemia cannot be ruled   - Echo: 5/17/23 normal LV function, mild LVH  - atorvastatin 40mg added    - aspirin 324 and plavix 600 mg loaded in cath lab  - c/w asa 81 and plavix 75 qd  - plan: ASA 81mg , clopidogrel 75 mg qd, lasix 40mg IV x1  - Follow cardio out patient Dr Rodriguez

## 2023-07-07 NOTE — DISCHARGE NOTE PROVIDER - NSDCFUADDINST_GEN_ALL_CORE_FT
Wound Care:   the day AFTER your procedure...     Remove the bandage from the site and gently clean with soap and water then pat dry; leave open to air.     You may take a brief shower     Do NOT apply lotions, creams, powders, ointments, or perfumes to your incision site unless prescribed by your physician     Do NOT soak your procedure site for 1 week (no baths, no pools, no tubs, etc...)     Check  your groin and /or wrist daily. A small amount of bruising, and soreness are normal    ACTIVITY: for 24 hours      - DO NOT DRIVE     - DO NOT make any important decisions or sign legal documents      - DO NOT operate heavy machinery      - you may resume sexual activity in 48 hours, unless otherwise instructed by your cardiologist          If your procedure was done through the WRIST: for the NEXT 3 DAYS:          - avoid pushing, pulling, with that affected wrist (such as pushing up from a seated position)          - avoid repeated movement of that hand and wrist (such as typing or hammering)          - DO NOT LIFT anything more than 5 pounds    MEDICATION:      Please take your medications as explained to you (found on your discharge paperwork)      If you received a stent, you will be taking medication to KEEP YOUR STENT OPEN. Refer to the "keep your stent open sheet"           You MUST start taking this medication immediately.           Take this medication as prescribed and uninterrupted.            DO NOT STOP taking them for any reason without consulting with your cardiologist first.      **if you have diabetes and take metformin please do not take this medication for 2 days after the procedure. Restart and take as usual starting day 3.    Follow the heart healthy diet recommended by your doctor.   Drink plenty of water for the next 24 hours unless otherwise instructed.  Do not drink any alcoholic beverages for 24 hours (beer, wine, liquor, etc).    If you smoke: STOP SMOKING. Call the [facility] at [phone] for assistance.    CALL your cardiologist/primary care doctor to make a follow-up appointment in 2 WEEKS     **CALL YOUR DOCTOR if you experience     fever, chills, body aches, or severe pain, swelling, redness, heat or yellow discharge at incision site     bleeding or excruciating pain at the procedural site, swelling (golf ball size) at your procedural site     CHEST PAIN     numbness, tingling, temperature change (of your procedural site)     Pain  -you may have pain after your surgery or procedure at the puncture site or in the artery/vein that has been treated.  -take pain medication as directed by your doctor.  call your doctor if your pain is not getting better within 5 days or if it gets worse  -prescription pain medication should be taken with food, and can cause constipation, an over-the-counter softener may be helpful    Nausea  -anesthesia/sedation can upset your stomach  -eat bland foods (Jell-o, crackers, toast) and drink ginger ale if you are nauseated  -drink plenty of fluids such as water or ginger ale (unless instructed otherwise by your doctor)  -if you have nausea or vomiting the day after your procedure, call your doctor    Bleeding  -you may have a small amount of oozing from your surgical or procedural site  -bleeding as the site can be dangerous and should prompt immediate medical attention    Infection  -if you have any of the following signs of infection, call your doctor:       redness, swelling, fever over 101 degrees, thick yellow/white drainage    If you are unable to reach your doctor, you may contact:   Dr Jade Urias @ [phone]    **Call 911 immediately if:     - your hand or leg becomes blue, feels cold to touch, or if you have numbness or tingling     - bleeding or swelling from your wrist or groin site cannot be controlled or if area becomes very red or hot to touch     - you have pain, pressure, tightness or burning in your chest, arms, jaw or stomach; shortness of breath; nausea or excessive sweating; lightheadedness; dizziness or a fainting spell; or if you have sudden back or stomach pain     -you have rapid heartbeat or palpitations     - you have bright red blood in large amounts, severe pain at access site (wrist or groin) or significant new swelling at the puncture site

## 2023-07-07 NOTE — H&P CARDIOLOGY - CARDIOVASCULAR DETAILS
[facility] 65   OPERATIVE REPORT       Name:  Paula Austin   MR#:  [id number]   :  1951   Account #:  [de-identified]   Date of Adm:  2017       DATE OF SURGERY: 2017    PREOPERATIVE DIAGNOSIS: Mechanical complications of hardware,   left hip. POSTOPERATIVE DIAGNOSIS: Mechanical complications of hardware,   left hip. PROCEDURE: Removal of deep hardware, left hip. OPERATING TEAM: Jeronimo Espinoza. Rachael Alexander MD    ANESTHESIA: General.    PROCEDURE: The patient was brought to the operative suite   and placed in supine position. After adequate anesthesia was   achieved, the patient was placed upon the fracture table. Peroneal post and foot holders were well-padded. The left hip   was prepped and draped in the usual sterile fashion. An incision   was made over the lateral aspect of the left hip along the line   of the previous skin incision. Hemostasis was achieved with   Bovie cautery. A guidewire for the Synthes 6.5 cannulated screws   was inserted under fluoroscopy. Each screw had the guidewire   inserted and then each screw was removed on power without   difficulty. AP and lateral fluoroscopic images confirmed that   the hardware was out. The fracture was healed anatomically. The   wound was then irrigated with normal saline and closed in   layers. A sterile compressive dressing was applied. The patient   was then transferred to the recovery room alert and oriented   under the care of Anesthesia. ESTIMATED BLOOD LOSS: 50 mL. FLUIDS: See anesthesia record. CLOSURE: Primary. COMPLICATIONS: None. MD Stephanie Patterson / Boaz Bailey   D:  2017   22:42   T:  2017   05:45   Job #:  [id number] positive S1/positive S2

## 2023-07-07 NOTE — CONSULT NOTE ADULT - ATTENDING COMMENTS
58 year old male with no implantable cardiac devices and PMH chronic hereditary lymphedema, paralyzed R diaphragm, PNA, HTN, morbid obesity, HAKAN on CPAP, prediabetes (A1c 6.0). s/p cardiac cath 7/723, here for post cath observation and close monitoring. Continue ASA, Plavix. PT/OT. Treatment will be dependent on clinical course. Agree with H&P as outlined above, edited where appropriate. 58 year old male with no implantable cardiac devices and PMH chronic hereditary lymphedema, paralyzed R diaphragm, PNA, HTN, morbid obesity, HAKAN on CPAP, prediabetes (A1c 6.0). Patient s/p right and left heart catheterization s/p mLAD CAD, s/p EFRAÍN x 1 on 7/7/2023, admitted for post cath observation and close monitoring. Continue ASA, Plavix. PT/OT. Treatment will be dependent on clinical course. Agree with H&P as outlined above, edited where appropriate.

## 2023-07-07 NOTE — DISCHARGE NOTE PROVIDER - ATTENDING DISCHARGE PHYSICAL EXAMINATION:
PHYSICAL EXAM:  GENERAL: NAD  HEAD:  Atraumatic, Normocephalic  EYES: EOMI,conjunctiva and sclera clear  ENMTMoist mucous membranes  NECK: Supple, No JVD  NERVOUS SYSTEM:  Alert & Oriented X3, no focal deficit   CHEST/LUNG: CTA bilaterally.  HEART: Regular rate and rhythm; No murmurs  ABDOMEN: Soft, Nontender, Nondistended; Bowel sounds present  EXTREMITIES:  +b/l lymphedema L>R.

## 2023-07-07 NOTE — DISCHARGE NOTE PROVIDER - HOSPITAL COURSE
7/7 same day admission for elective R+L heart cath via R radial artery and R brachial vein. s/p EFRAÍN x 1 to 70% mLAD stenosis. Lasix 40mg IV x 1 for LVEDP 35. Admitted/tx to CPU for overnight monitoring. 7/7 same day admission for elective R+L heart cath via R radial artery and R brachial vein. s/p EFRAÍN x 1 to 70% mLAD stenosis. Lasix 40mg IV x 1 for LVEDP 35. Admitted/tx to CPU for overnight monitoring. condition stable. discussed with cardiology, clear for discharge.   His oxygen on room air 89-91% , advised to use oxygen at home as need to keep Pox >90, patient states he has oxygen and oximeter  at home

## 2023-07-07 NOTE — CONSULT NOTE ADULT - PROBLEM SELECTOR RECOMMENDATION 3
hx of lymphedema chronic   - on torsemide  20 mg at home   - continue with hold parameters hx of lymphedema chronic   - on torsemide 20 mg at home   - continue with hold parameters

## 2023-07-08 ENCOUNTER — TRANSCRIPTION ENCOUNTER (OUTPATIENT)
Age: 58
End: 2023-07-08

## 2023-07-08 VITALS
RESPIRATION RATE: 26 BRPM | OXYGEN SATURATION: 91 % | HEART RATE: 62 BPM | DIASTOLIC BLOOD PRESSURE: 61 MMHG | SYSTOLIC BLOOD PRESSURE: 105 MMHG | TEMPERATURE: 98 F

## 2023-07-08 PROCEDURE — 93460 R&L HRT ART/VENTRICLE ANGIO: CPT | Mod: 59

## 2023-07-08 PROCEDURE — C1887: CPT

## 2023-07-08 PROCEDURE — 93005 ELECTROCARDIOGRAM TRACING: CPT

## 2023-07-08 PROCEDURE — 36415 COLL VENOUS BLD VENIPUNCTURE: CPT

## 2023-07-08 PROCEDURE — 99222 1ST HOSP IP/OBS MODERATE 55: CPT

## 2023-07-08 PROCEDURE — 93799 UNLISTED CV SVC/PROCEDURE: CPT | Mod: LD

## 2023-07-08 PROCEDURE — 92978 ENDOLUMINL IVUS OCT C 1ST: CPT

## 2023-07-08 PROCEDURE — C1874: CPT

## 2023-07-08 PROCEDURE — 80048 BASIC METABOLIC PNL TOTAL CA: CPT

## 2023-07-08 PROCEDURE — 99152 MOD SED SAME PHYS/QHP 5/>YRS: CPT

## 2023-07-08 PROCEDURE — C1889: CPT

## 2023-07-08 PROCEDURE — 85027 COMPLETE CBC AUTOMATED: CPT

## 2023-07-08 PROCEDURE — C1894: CPT

## 2023-07-08 PROCEDURE — C1769: CPT

## 2023-07-08 PROCEDURE — 99239 HOSP IP/OBS DSCHRG MGMT >30: CPT

## 2023-07-08 PROCEDURE — C9600: CPT | Mod: LD

## 2023-07-08 PROCEDURE — C1753: CPT

## 2023-07-08 PROCEDURE — 93010 ELECTROCARDIOGRAM REPORT: CPT

## 2023-07-08 PROCEDURE — C1725: CPT

## 2023-07-08 RX ORDER — LOSARTAN POTASSIUM 100 MG/1
1 TABLET, FILM COATED ORAL
Refills: 0 | DISCHARGE

## 2023-07-08 RX ORDER — POTASSIUM CHLORIDE 20 MEQ
1 PACKET (EA) ORAL
Qty: 30 | Refills: 0
Start: 2023-07-08 | End: 2023-08-06

## 2023-07-08 RX ORDER — POTASSIUM CHLORIDE 20 MEQ
0 PACKET (EA) ORAL
Refills: 0 | DISCHARGE

## 2023-07-08 RX ORDER — FLUTICASONE FUROATE, UMECLIDINIUM BROMIDE AND VILANTEROL TRIFENATATE 200; 62.5; 25 UG/1; UG/1; UG/1
0 POWDER RESPIRATORY (INHALATION)
Refills: 0 | DISCHARGE

## 2023-07-08 RX ORDER — POTASSIUM CHLORIDE 20 MEQ
1 PACKET (EA) ORAL
Refills: 0 | DISCHARGE

## 2023-07-08 RX ORDER — LOSARTAN POTASSIUM 100 MG/1
1 TABLET, FILM COATED ORAL
Qty: 30 | Refills: 0
Start: 2023-07-08 | End: 2023-08-06

## 2023-07-08 RX ORDER — CLOPIDOGREL BISULFATE 75 MG/1
1 TABLET, FILM COATED ORAL
Qty: 90 | Refills: 3
Start: 2023-07-08 | End: 2024-07-01

## 2023-07-08 RX ORDER — LOSARTAN POTASSIUM 100 MG/1
0 TABLET, FILM COATED ORAL
Refills: 0 | DISCHARGE

## 2023-07-08 RX ORDER — ASPIRIN/CALCIUM CARB/MAGNESIUM 324 MG
1 TABLET ORAL
Qty: 90 | Refills: 3
Start: 2023-07-08 | End: 2024-07-01

## 2023-07-08 RX ORDER — ATORVASTATIN CALCIUM 80 MG/1
1 TABLET, FILM COATED ORAL
Qty: 90 | Refills: 3
Start: 2023-07-08 | End: 2024-07-01

## 2023-07-08 RX ADMIN — Medication 81 MILLIGRAM(S): at 05:56

## 2023-07-08 RX ADMIN — Medication 20 MILLIGRAM(S): at 05:56

## 2023-07-08 RX ADMIN — CLOPIDOGREL BISULFATE 75 MILLIGRAM(S): 75 TABLET, FILM COATED ORAL at 05:56

## 2023-07-08 RX ADMIN — LOSARTAN POTASSIUM 100 MILLIGRAM(S): 100 TABLET, FILM COATED ORAL at 05:56

## 2023-07-08 NOTE — DISCHARGE NOTE NURSING/CASE MANAGEMENT/SOCIAL WORK - NSDCPEFALRISK_GEN_ALL_CORE
For information on Fall & Injury Prevention, visit: [email or web address] OR  [email or web address] OR  https://www.cdc.gov/steadi/patient.html

## 2023-07-08 NOTE — PATIENT PROFILE ADULT - TOBACCO USE
Final Anesthesia Post-op Assessment    Patient: Rochelle Shahid  Procedure(s) Performed: ESOPHAGOGASTRODUODENOSCOPY [[id number]]  Anesthesia type: Monitor Anesthesia Care    Vital Last Value   Temperature 37.2 °C (99 °F) (10/17/17 0837)   Pulse 70 (10/17/17 0837)   Respiratory Rate 17 (10/17/17 0837)   Non-Invasive   Blood Pressure 187/88 (10/17/17 0837)   Arterial  Blood Pressure     Pulse Oximetry 97 % (10/17/17 0837)     Last 24 I/O:   Intake/Output Summary (Last 24 hours) at 10/17/17 0923  Last data filed at 10/17/17 0852   Gross per 24 hour   Intake                0 ml   Output                0 ml   Net                0 ml       PATIENT LOCATION: Phase II  POST-OP VITAL SIGNS: stable  LEVEL OF CONSCIOUSNESS: participates in exam, awake, oriented, answers questions appropriately and alert  RESPIRATORY STATUS: spontaneous ventilation and unassisted  CARDIOVASCULAR: blood pressure returned to baseline  HYDRATION: euvolemic    PAIN MANAGEMENT: well controlled  NAUSEA: None  AIRWAY PATENCY: patent  POST-OP ASSESSMENT: no complications, patient tolerated procedure well with no complications, no evidence of recall and sufficiently recovered from acute administration of anesthesia effects and able to participate in evaluation  COMPLICATIONS: none  HANDOFF:  Handoff to receiving nurse was performed and questions were answered      
Never smoker

## 2023-07-08 NOTE — CONSULT NOTE ADULT - SUBJECTIVE AND OBJECTIVE BOX
[facility] - Fran Beasley, Michael Shields, Gem Ortiz  Office Number: [phone]    Initial Consult Note    CHIEF COMPLAINT: Patient is a 58y old  Male who presents with a chief complaint of Patient referred for right and left heart catheterization (07 Jul 2023 15:11)      HPI:  58 year old male with no implantable cardiac devices and PMH chronic hereditary lymphedema, paralyzed R diaphragm, PNA, HTN, morbid obesity, HAKAN on CPAP, prediabetes (A1c 6.0).   He presented to the hospital for elective L and R heart cath to [facility] for pulm HTN and heart failure. He is feeling well and SOB has improved since he was switched from lasix to torsemide. Denies CP, SOB, palpitations, N/V, fever/chills, abd pain, numbness/tingling/weakness, other c/o at this time.     cath report:  1. right radial artery and right brachial vein accessed  2. 70% mLAD stenosis, iFR 0.89%, s/p EFRAÍN x 1  3. pulm HTN w/ PAS 50s  4. elevated LVEDP (35)  aspirin 324 and plavix 600 mg loaded in cath lab  continue asa 81 and plavix 75 qd  statin added, continue losartan     Cardiology Summary    ECG: Sinus Bradycardia   Low voltage with rightward P-axis and rotation -possible pulmonary disease.     Stress Test: 2021 ([facility]) small defect presents in the mid anterolateral will may be 2/2 artifact but ischemia cannot be ruled   Echo: 5/17/23 normal LV function, mild LVH          Adjusted CathPCI Bleeding Event Risk: 0.7%    (07 Jul 2023 11:02)      PAST MEDICAL & SURGICAL HISTORY:  HTN (hypertension)      Lymphedema of both lower extremities      Morbid obesity      Paralyzed hemidiaphragm      HAKAN on CPAP      Prediabetes      PNA (pneumonia)      History of cholecystectomy          SOCIAL HISTORY:  No tobacco, ethanol, or drug abuse.    FAMILY HISTORY:  FH: Alzheimers disease (Father)      No family history of acute MI or sudden cardiac death.    MEDICATIONS  (STANDING):  aspirin enteric coated 81 milliGRAM(s) Oral <User Schedule>  atorvastatin 40 milliGRAM(s) Oral at bedtime  clopidogrel Tablet 75 milliGRAM(s) Oral <User Schedule>  losartan 100 milliGRAM(s) Oral daily  torsemide 20 milliGRAM(s) Oral daily    MEDICATIONS  (PRN):      Allergies    No Known Allergies    Intolerances        REVIEW OF SYSTEMS:    CONSTITUTIONAL: No weakness, fevers or chills  EYES/ENT: No visual changes;  No vertigo or throat pain   NECK: No pain or stiffness  RESPIRATORY: No cough, wheezing, hemoptysis; No shortness of breath  CARDIOVASCULAR: No chest pain or palpitations  GASTROINTESTINAL: No abdominal pain. No nausea, vomiting, or hematemesis; No diarrhea or constipation. No melena or hematochezia.  GENITOURINARY: No dysuria, frequency or hematuria  NEUROLOGICAL: No numbness or weakness  SKIN: No itching or rash  All other review of systems is negative unless indicated above    VITAL SIGNS:   Vital Signs Last 24 Hrs  T(C): 37.1 (08 Jul 2023 06:00), Max: 37.1 (08 Jul 2023 06:00)  T(F): 98.7 (08 Jul 2023 06:00), Max: 98.7 (08 Jul 2023 06:00)  HR: 56 (08 Jul 2023 08:10) (48 - 70)  BP: 102/71 (08 Jul 2023 08:10) (102/71 - 159/90)  BP(mean): 77 (08 Jul 2023 06:00) (77 - 114)  RR: 24 (08 Jul 2023 08:10) (18 - 33)  SpO2: 92% (08 Jul 2023 08:10) (88% - 96%)    Parameters below as of 08 Jul 2023 08:10  Patient On (Oxygen Delivery Method): nasal cannula  O2 Flow (L/min): 2      I&O's Summary    07 Jul 2023 07:01  -  08 Jul 2023 07:00  --------------------------------------------------------  IN: 350 mL / OUT: 1700 mL / NET: -1350 mL        On Exam:    Constitutional: NAD, alert and oriented x 3  Lungs:  Non-labored, breath sounds are clear bilaterally, No wheezing, rales or rhonchi  Cardiovascular: RRR.  S1 and S2 positive.  No murmurs, rubs, gallops or clicks  Gastrointestinal: Bowel Sounds present, soft, nontender.   Lymph: No peripheral edema. No cervical lymphadenopathy.  Neurological: Alert, no focal deficits  Skin: No rashes or ulcers   Psych:  Mood & affect appropriate.    LABS: All Labs Reviewed:                        14.7   9.05  )-----------( 145      ( 07 Jul 2023 10:50 )             44.1     07 Jul 2023 10:50    141    |  107    |  19     ----------------------------<  117    3.8     |  28     |  1.10     Ca    9.0        07 Jul 2023 10:50            Blood Culture:         RADIOLOGY:    EKG:

## 2023-07-08 NOTE — PATIENT PROFILE ADULT - DO YOU FEEL THREATENED BY OTHERS?
no
Alcohol use disorder, severe, dependence    Alcohol-induced acute pancreatitis, unspecified complication status    Borderline diabetes mellitus  last A1c unknown  Depression    Depression, unspecified depression type    Depression, unspecified depression type  Lost his son this past June 2016  Diabetes  type 2  Essential hypertension    Essential hypertension    HLD (hyperlipidemia)    HTN (hypertension)    Insomnia    Low back pain, unspecified back pain laterality, unspecified chronicity, with sciatica presence unspecified    Low testosterone in male    Low testosterone level in male    Lumbar herniated disc    Obesity (BMI 30-39.9)    ROSS (obstructive sleep apnea)    Sleep apnea, unspecified type    Sprain of right rotator cuff capsule, subsequent encounter

## 2023-07-08 NOTE — DISCHARGE NOTE NURSING/CASE MANAGEMENT/SOCIAL WORK - PATIENT PORTAL LINK FT
You can access the FollowMyHealth Patient Portal offered by [facility] by registering at the following website: [email or web address]. By joining [employer]s FollowMyHealth portal, you will also be able to view your health information using other applications (apps) compatible with our system.

## 2023-07-08 NOTE — CONSULT NOTE ADULT - ASSESSMENT
58 year old male PMH chronic hereditary lymphedema, paralyzed R diaphragm, PNA, HTN, morbid obesity, HAKAN on CPAP, prediabetes (A1c 6.0).   He presented to the hospital for elective L and R heart cath to eval for pulm HTN and heart failure. He is feeling well and SOB has improved since he was switched from lasix to torsemide. Denies CP, SOB, palpitations, N/V, fever/chills, abd pain, numbness/tingling/weakness, other c/o at this time.     cath report:  1. right radial artery and right brachial vein accessed  2. 70% mLAD stenosis, iFR 0.89%, s/p EFRAÍN x 1  3. pulm HTN w/ PAS 50s  4. elevated LVEDP (35)    aspirin 324 and plavix 600 mg loaded in cath lab  continue asa 81 and plavix 75 qd  statin added, continue losartan  Pt has f/u with Dr Urias and Dr Rodriguez  Can be discharged home

## 2023-07-08 NOTE — PATIENT PROFILE ADULT - FALL HARM RISK - RISK INTERVENTIONS
Assistance OOB with selected safe patient handling equipment/Assistance with ambulation/Communicate Fall Risk and Risk Factors to all staff, patient, and family/Monitor gait and stability/Reinforce activity limits and safety measures with patient and family/Sit up slowly, dangle for a short time, stand at bedside before walking/Use of alarms - bed, chair and/or voice tab/Visual Cue: Yellow wristband/Bed in lowest position, wheels locked, appropriate side rails in place/Call bell, personal items and telephone in reach/Instruct patient to call for assistance before getting out of bed or chair/Non-slip footwear when patient is out of bed/[place] to call system/Physically safe environment - no spills, clutter or unnecessary equipment/Purposeful Proactive Rounding/Room/bathroom lighting operational, light cord in reach

## 2023-07-12 PROBLEM — E66.01 MORBID (SEVERE) OBESITY DUE TO EXCESS CALORIES: Chronic | Status: ACTIVE | Noted: 2023-07-07

## 2023-07-12 PROBLEM — J18.9 PNEUMONIA, UNSPECIFIED ORGANISM: Chronic | Status: ACTIVE | Noted: 2023-07-07

## 2023-07-12 PROBLEM — G47.33 OBSTRUCTIVE SLEEP APNEA (ADULT) (PEDIATRIC): Chronic | Status: ACTIVE | Noted: 2023-07-07

## 2023-07-12 PROBLEM — R73.03 PREDIABETES: Chronic | Status: ACTIVE | Noted: 2023-07-07

## 2023-07-12 PROBLEM — J98.6 DISORDERS OF DIAPHRAGM: Chronic | Status: ACTIVE | Noted: 2023-07-07

## 2023-07-12 PROBLEM — I10 ESSENTIAL (PRIMARY) HYPERTENSION: Chronic | Status: ACTIVE | Noted: 2023-07-07

## 2023-07-12 PROBLEM — I89.0 LYMPHEDEMA, NOT ELSEWHERE CLASSIFIED: Chronic | Status: ACTIVE | Noted: 2023-07-07

## 2023-07-14 ENCOUNTER — APPOINTMENT (OUTPATIENT)
Dept: CARDIOLOGY | Facility: CLINIC | Age: 58
End: 2023-07-14
Payer: COMMERCIAL

## 2023-07-14 ENCOUNTER — NON-APPOINTMENT (OUTPATIENT)
Age: 58
End: 2023-07-14

## 2023-07-14 VITALS
DIASTOLIC BLOOD PRESSURE: 57 MMHG | HEIGHT: 70 IN | HEART RATE: 62 BPM | BODY MASS INDEX: 45.1 KG/M2 | TEMPERATURE: 97.4 F | SYSTOLIC BLOOD PRESSURE: 90 MMHG | WEIGHT: 315 LBS | OXYGEN SATURATION: 90 %

## 2023-07-14 DIAGNOSIS — I10 ESSENTIAL (PRIMARY) HYPERTENSION: ICD-10-CM

## 2023-07-14 PROCEDURE — 99214 OFFICE O/P EST MOD 30 MIN: CPT

## 2023-07-14 PROCEDURE — 93000 ELECTROCARDIOGRAM COMPLETE: CPT

## 2023-07-15 ENCOUNTER — NON-APPOINTMENT (OUTPATIENT)
Age: 58
End: 2023-07-15

## 2023-07-15 PROBLEM — I10 HYPERTENSION: Status: ACTIVE | Noted: 2023-05-10

## 2023-07-15 NOTE — CARDIOLOGY SUMMARY
[de-identified] : 7/14/23\par Sinus  Rhythm \par -RSR(V1) -nondiagnostic. \par  -  Diffuse nonspecific T-abnormality. \par  Low voltage -possible pulmonary disease. \par \par ABNORMAL \par

## 2023-07-15 NOTE — DISCUSSION/SUMMARY
[EKG obtained to assist in diagnosis and management of assessed problem(s)] : EKG obtained to assist in diagnosis and management of assessed problem(s) [FreeTextEntry1] : s/p LAD stent (abn IFR)\par doing well\par radial site healed well\par compliant to meds\par referral for cardiac rehab faxed \par F/u with Dr. Rodriguez\par

## 2023-07-15 NOTE — HISTORY OF PRESENT ILLNESS
[FreeTextEntry1] : recent admission to [facility] for dyspnea/CP\par s/p LAD stent - radially\par returning for access site check\par Feels well\par no CP\par Med compliance discussed\par discussed cardiac rehab and request faxed

## 2023-07-15 NOTE — PHYSICAL EXAM
[Well Developed] : well developed [Well Nourished] : well nourished [No Acute Distress] : no acute distress [Normal Conjunctiva] : normal conjunctiva [No Carotid Bruit] : no carotid bruit [Normal Venous Pressure] : normal venous pressure [Normal S1, S2] : normal S1, S2 [No Murmur] : no murmur [No Rub] : no rub [No Gallop] : no gallop [Good Air Entry] : good air entry [Clear Lung Fields] : clear lung fields [No Respiratory Distress] : no respiratory distress  [Soft] : abdomen soft [Non Tender] : non-tender [No Masses/organomegaly] : no masses/organomegaly [Normal Bowel Sounds] : normal bowel sounds [Normal Gait] : normal gait [No Cyanosis] : no cyanosis [No Edema] : no edema [No Clubbing] : no clubbing [No Varicosities] : no varicosities [No Rash] : no rash [No Skin Lesions] : no skin lesions [Moves all extremities] : moves all extremities [No Focal Deficits] : no focal deficits [Normal Speech] : normal speech [Alert and Oriented] : alert and oriented [Normal memory] : normal memory [de-identified] : radial site witout hematoma +2 pulse

## 2023-07-18 ENCOUNTER — NON-APPOINTMENT (OUTPATIENT)
Age: 58
End: 2023-07-18

## 2023-07-18 ENCOUNTER — APPOINTMENT (OUTPATIENT)
Dept: CARDIOLOGY | Facility: CLINIC | Age: 58
End: 2023-07-18
Payer: COMMERCIAL

## 2023-07-18 VITALS
HEART RATE: 58 BPM | DIASTOLIC BLOOD PRESSURE: 66 MMHG | BODY MASS INDEX: 45.1 KG/M2 | OXYGEN SATURATION: 88 % | WEIGHT: 315 LBS | HEIGHT: 70 IN | SYSTOLIC BLOOD PRESSURE: 119 MMHG

## 2023-07-18 DIAGNOSIS — Z95.5 PRESENCE OF CORONARY ANGIOPLASTY IMPLANT AND GRAFT: ICD-10-CM

## 2023-07-18 PROCEDURE — 93000 ELECTROCARDIOGRAM COMPLETE: CPT

## 2023-07-18 PROCEDURE — 99214 OFFICE O/P EST MOD 30 MIN: CPT

## 2023-07-18 NOTE — REVIEW OF SYSTEMS
[Dyspnea on exertion] : dyspnea during exertion [Orthopnea] : orthopnea [Negative] : Heme/Lymph [de-identified] : left leg lymphedema

## 2023-07-18 NOTE — HISTORY OF PRESENT ILLNESS
[FreeTextEntry1] : Fortunato is a 58 year old man with a history HTN, morbid obesity, right paralyzed hemidiaphragm, chronic lymphedema. HAKAN on CPAP presents to the office after an admission to [facility] for dyspnea and chest pain. He is now post right and left heart catheterization on 7/7/23 by Dr Urias at [facility] via venous access through the right brachial. He is s/p LAD stent.\par \par Since his last visit, he is feeling much better. He notes that his exercise tolerance has increased. He is able to able to walk about 150 feet and his recovery has improved. . He notes mild chronic orthopnea. He  denies any chest pain, PND, near syncope, syncope, strokelike symptoms. \par Medication reconciliation performed. He is compliant with his medications. He is presently taking losartan 100 mg p.o. daily, torsemide 20 mg 1 tablet daily, as well as aspirin 81 mg p.o. daily Lipitor 40 mg p.o. at bedtime Plavix 75 mg daily and potassium 10 mEq daily.\par \par 
 used

## 2023-07-18 NOTE — CARDIOLOGY SUMMARY
[de-identified] : Sinus  Rhythm\par Low voltage in the precordial leads\par  [de-identified] : 2021 ([facility]) small defect presents in the mid anterolateral will may be 2/2 artifact but ischemia cannot be ruled  [de-identified] : 5/17/23 normal LV function, mild LVH

## 2023-07-18 NOTE — PHYSICAL EXAM
[Normal] : normal venous pressure, no carotid bruit [Normal Rate] : normal [Rhythm Regular] : regular [Normal S1] : normal S1 [Normal S2] : normal S2 [Distant] : the heart sounds were distant [No Murmur] : no murmurs heard [___ +] : bilateral [unfilled]U+ pretibial pitting edema [Rt] : varicose veins of the right leg noted [Lt] : varicose veins of the left leg noted [2+] : left 2+ [Right Carotid Bruit] : no bruit heard over the right carotid [Left Carotid Bruit] : no bruit heard over the left carotid [Clear Lung Fields] : clear lung fields [Good Air Entry] : good air entry [No Respiratory Distress] : no respiratory distress  [Soft] : abdomen soft [Non Tender] : non-tender [Normal Gait] : normal gait [No Cyanosis] : no cyanosis [No Clubbing] : no clubbing [No Rash] : no rash [No Skin Lesions] : no skin lesions [Moves all extremities] : moves all extremities [No Focal Deficits] : no focal deficits [Alert and Oriented] : alert and oriented [Normal memory] : normal memory [de-identified] : left leg lymphedema

## 2023-07-18 NOTE — DISCUSSION/SUMMARY
[FreeTextEntry1] : Fortunato is a 58 year old man with a history HTN, morbid obesity, right paralyzed hemidiaphragm, chronic lymphedema. HAKAN on CPAP presents to the office after an admission to [facility] for dyspnea and chest pain. He is now post right and left heart catheterization on 7/7/23 by Dr Urias at [facility] via venous access through the right brachial. He is s/p LAD stent.\par \par Fortunato continues to feel like he has dramatically improved on the torsemide rather than lasix. He is taking it in the afternoon because of brisk diuresis. \par There are no complaints of chest pain, shortness of breath, dizziness, palpitations, or syncope.  He will continue taking his medications as ordered.  I have emphasized the importance of continuing his medications on an every day basis, as well as watching his diet and losing weight. Exercise and diet counseling was performed in order to reduce her future cardiovascular risk. I have asked him to wrap an Ace bandage preferably a 6 inch Ace bandage around the left leg for the lymphedema.\par \par He will followup with Dr Rodriguez in August 2023 or sooner if necessary. Further recommendations will be based on his clinical course.\par  [EKG obtained to assist in diagnosis and management of assessed problem(s)] : EKG obtained to assist in diagnosis and management of assessed problem(s)

## 2023-08-15 ENCOUNTER — APPOINTMENT (OUTPATIENT)
Dept: CARDIOLOGY | Facility: CLINIC | Age: 58
End: 2023-08-15
Payer: COMMERCIAL

## 2023-08-15 VITALS — DIASTOLIC BLOOD PRESSURE: 67 MMHG | OXYGEN SATURATION: 89 % | HEART RATE: 59 BPM | SYSTOLIC BLOOD PRESSURE: 111 MMHG

## 2023-08-15 PROCEDURE — 99214 OFFICE O/P EST MOD 30 MIN: CPT

## 2023-08-15 PROCEDURE — 93000 ELECTROCARDIOGRAM COMPLETE: CPT

## 2023-08-15 NOTE — DISCUSSION/SUMMARY
[FreeTextEntry1] : 58 year man with a history as listed presents for a [facility].  Fortunato is doing well post PCI. He denies any anginal symptoms. Clinically he is euvolemic on exam. He has frequent PVCs.  He will continue DAPT for now (at least 3 months). He will continue with statin therapy to achieve maintain goal LDL<100 or ideally <70.  Start on toprol 25mg Qday.  He will reduce his Tosemide torsemide 20mg QOD and start Farxiga 10mg Qday.  He will continue Losartan 100mg Qday.  He will continue CPAP.  Exercise and diet counseling was performed in order to reduce her future cardiovascular risk.  Obesity is playing a large component of it. We spoke about possible GLP1 agonist in the future. He will follow-up with me in 2-3 months or sooner if necessary.   [EKG obtained to assist in diagnosis and management of assessed problem(s)] : EKG obtained to assist in diagnosis and management of assessed problem(s)

## 2023-08-15 NOTE — PHYSICAL EXAM
[Normal] : normal venous pressure, no carotid bruit [Normal Rate] : normal [Rhythm Regular] : regular [Normal S1] : normal S1 [Normal S2] : normal S2 [Distant] : the heart sounds were distant [No Murmur] : no murmurs heard [___ +] : bilateral [unfilled]U+ pretibial pitting edema [Rt] : varicose veins of the right leg noted [Lt] : varicose veins of the left leg noted [2+] : left 2+ [Right Carotid Bruit] : no bruit heard over the right carotid [Left Carotid Bruit] : no bruit heard over the left carotid [Clear Lung Fields] : clear lung fields [Good Air Entry] : good air entry [No Respiratory Distress] : no respiratory distress  [Soft] : abdomen soft [Non Tender] : non-tender [Normal Gait] : normal gait [No Cyanosis] : no cyanosis [No Clubbing] : no clubbing [No Rash] : no rash [No Skin Lesions] : no skin lesions [Moves all extremities] : moves all extremities [No Focal Deficits] : no focal deficits [Alert and Oriented] : alert and oriented [Normal memory] : normal memory

## 2023-08-15 NOTE — HISTORY OF PRESENT ILLNESS
[FreeTextEntry1] : 58 year old man with a history CAD s/p PCI, HFpEF, HTN, morbid obesity, right paralyzed hemidiaphragm, chronic lymphedema. HAKAN on CPAP presents for an followup cardiac evaluation.   Since his last visit he is now s/p PCI in LAD. He is feeling so much better. He is walking more than before and feeling much better. He notes mild chronic orthopnea. He   denies any chest pain, PND, near syncope, syncope, stroke like symptoms. No reported melena, hematochezia or hematemesis. His abdominal girth has been dropping. Medication reconciliation performed. He is compliant with his medications. He is taking Torsemide 20mg Qday.

## 2023-08-15 NOTE — CARDIOLOGY SUMMARY
[de-identified] : SR frequent PVCs., low voltage, diffuse T wave changes.  [de-identified] : 2021 ([facility]) small defect presents in the mid anterolateral will may be 2/2 artifact but ischemia cannot be ruled  [de-identified] : 5/17/23 normal LV function, mild LVH [de-identified] : 7/2023 s/p LAD stent - radially

## 2023-08-22 ENCOUNTER — APPOINTMENT (OUTPATIENT)
Dept: CARDIOLOGY | Facility: CLINIC | Age: 58
End: 2023-08-22

## 2023-11-15 ENCOUNTER — TRANSCRIPTION ENCOUNTER (OUTPATIENT)
Age: 58
End: 2023-11-15

## 2023-12-13 ENCOUNTER — APPOINTMENT (OUTPATIENT)
Dept: CARDIOLOGY | Facility: CLINIC | Age: 58
End: 2023-12-13
Payer: COMMERCIAL

## 2023-12-13 VITALS
SYSTOLIC BLOOD PRESSURE: 104 MMHG | BODY MASS INDEX: 45.1 KG/M2 | OXYGEN SATURATION: 95 % | WEIGHT: 315 LBS | DIASTOLIC BLOOD PRESSURE: 70 MMHG | HEIGHT: 70 IN | HEART RATE: 45 BPM

## 2023-12-13 DIAGNOSIS — I89.0 LYMPHEDEMA, NOT ELSEWHERE CLASSIFIED: ICD-10-CM

## 2023-12-13 PROCEDURE — 99214 OFFICE O/P EST MOD 30 MIN: CPT

## 2023-12-13 PROCEDURE — 93000 ELECTROCARDIOGRAM COMPLETE: CPT

## 2023-12-13 NOTE — CARDIOLOGY SUMMARY
[de-identified] : Marked sinus Bradycardia  -Poor R-wave progression -may be secondary to pulmonary disease  consider old anterior infarct. -Diffuse nonspecific T-abnormality.   [de-identified] : 2021 ([facility]) small defect presents in the mid anterolateral will may be 2/2 artifact but ischemia cannot be ruled  [de-identified] : 5/17/23 normal LV function, mild LVH [de-identified] : 7/2023 s/p LAD stent - radially

## 2023-12-13 NOTE — DISCUSSION/SUMMARY
[FreeTextEntry1] : 58 year man with a history as listed [facility].  Fortunato is doing well. He denies any anginal symptoms. Clinically he is euvolemic on exam. His EKG did not reveal any significant ischemic changes.  He will continue DAPT for now (at least 3 months). He will continue with statin therapy to achieve maintain goal LDL<100 or ideally <70.  He will continue  toprol 25mg Qday. He is now getting PVCs.  He will resume his torsemide but will take it when he gets home from school. Continue Farxiga 10mg Qday.  He will continue Losartan 100mg Qday.  He will continue CPAP.  Exercise and diet counseling was performed in order to reduce her future cardiovascular risk.  Obesity is playing a large component of it. We spoke about possible GLP1 agonist. he will talk to  He will follow-up with me in 2-3 months or sooner if necessary.   [EKG obtained to assist in diagnosis and management of assessed problem(s)] : EKG obtained to assist in diagnosis and management of assessed problem(s)

## 2023-12-13 NOTE — HISTORY OF PRESENT ILLNESS
[FreeTextEntry1] : 58 year old man with a history CAD s/p PCI to LAD in 2023,  HFpEF, HTN, morbid obesity, right paralyzed hemidiaphragm, chronic lymphedema. HAKAN on CPAP presents for an followup cardiac evaluation.   Since his last visit he is doign well. He is walking more. He is doing more after school event.  He is still mildly orthopneic but much improved from previous.  He   denies any chest pain, PND, near syncope, syncope, stroke like symptoms. No reported melena, hematochezia or hematemesis Medication reconciliation performed. He is compliant with his medications. He is taking torsemide only on shravan weekned.

## 2024-01-25 RX ORDER — LOSARTAN POTASSIUM 100 MG/1
100 TABLET, FILM COATED ORAL DAILY
Qty: 90 | Refills: 2 | Status: ACTIVE | COMMUNITY
Start: 2023-05-10

## 2024-01-25 RX ORDER — METOPROLOL SUCCINATE 25 MG/1
25 TABLET, EXTENDED RELEASE ORAL DAILY
Qty: 90 | Refills: 2 | Status: ACTIVE | COMMUNITY
Start: 2023-08-15

## 2024-01-25 RX ORDER — CLOPIDOGREL BISULFATE 75 MG/1
75 TABLET, FILM COATED ORAL DAILY
Qty: 90 | Refills: 2 | Status: ACTIVE | COMMUNITY
Start: 2023-08-15

## 2024-01-25 RX ORDER — TORSEMIDE 20 MG/1
20 TABLET ORAL
Qty: 90 | Refills: 2 | Status: ACTIVE | COMMUNITY
Start: 2023-05-10

## 2024-01-25 RX ORDER — ATORVASTATIN CALCIUM 40 MG/1
40 TABLET, FILM COATED ORAL
Qty: 90 | Refills: 2 | Status: ACTIVE | COMMUNITY
Start: 2023-08-15

## 2024-01-25 RX ORDER — ASPIRIN 81 MG/1
81 TABLET, CHEWABLE ORAL DAILY
Qty: 90 | Refills: 2 | Status: ACTIVE | COMMUNITY
Start: 2023-08-15

## 2024-02-14 RX ORDER — DAPAGLIFLOZIN 10 MG/1
10 TABLET, FILM COATED ORAL
Qty: 90 | Refills: 2 | Status: ACTIVE | COMMUNITY
Start: 2023-08-15 | End: 1900-01-01

## 2024-04-03 ENCOUNTER — NON-APPOINTMENT (OUTPATIENT)
Age: 59
End: 2024-04-03

## 2024-04-03 ENCOUNTER — APPOINTMENT (OUTPATIENT)
Dept: CARDIOLOGY | Facility: CLINIC | Age: 59
End: 2024-04-03
Payer: COMMERCIAL

## 2024-04-03 VITALS
DIASTOLIC BLOOD PRESSURE: 79 MMHG | SYSTOLIC BLOOD PRESSURE: 122 MMHG | OXYGEN SATURATION: 90 % | HEART RATE: 50 BPM | HEIGHT: 70 IN

## 2024-04-03 DIAGNOSIS — I50.30 UNSPECIFIED DIASTOLIC (CONGESTIVE) HEART FAILURE: ICD-10-CM

## 2024-04-03 DIAGNOSIS — R06.09 OTHER FORMS OF DYSPNEA: ICD-10-CM

## 2024-04-03 DIAGNOSIS — I25.10 ATHEROSCLEROTIC HEART DISEASE OF NATIVE CORONARY ARTERY W/OUT ANGINA PECTORIS: ICD-10-CM

## 2024-04-03 PROCEDURE — 93000 ELECTROCARDIOGRAM COMPLETE: CPT

## 2024-04-03 PROCEDURE — G2211 COMPLEX E/M VISIT ADD ON: CPT

## 2024-04-03 PROCEDURE — 99214 OFFICE O/P EST MOD 30 MIN: CPT

## 2024-04-03 NOTE — CARDIOLOGY SUMMARY
[de-identified] : Marked sinus Bradycardia  -Poor R-wave progression -may be secondary to pulmonary disease  consider old anterior infarct. -Diffuse nonspecific T-abnormality.   [de-identified] : 2021 ([facility]) small defect presents in the mid anterolateral will may be 2/2 artifact but ischemia cannot be ruled  [de-identified] : 5/17/23 normal LV function, mild LVH [de-identified] : 7/2023 s/p LAD stent - radially

## 2024-04-03 NOTE — DISCUSSION/SUMMARY
[FreeTextEntry1] : 58 year man with a history as listed [facility].  Fortunato is doing well. He denies any anginal symptoms. Clinically he is euvolemic on exam. His EKG did not reveal any significant ischemic changes.  He will continue DAPT for till July. He will continue with statin therapy to achieve maintain goal LDL<100 or ideally <70.  He will continue  toprol 25mg Qday. His PVCs have resolved.   He will continue torsemide Continue Farxiga 10mg Qday.  He will continue Losartan 100mg Qday.  He will continue CPAP.  Exercise and diet counseling was performed in order to reduce her future cardiovascular risk.  Obesity is playing a large component of it. We spoke about possible GLP1 agonist.  He will follow-up with me in 2-3 months or sooner if necessary.   [EKG obtained to assist in diagnosis and management of assessed problem(s)] : EKG obtained to assist in diagnosis and management of assessed problem(s)

## 2024-04-03 NOTE — HISTORY OF PRESENT ILLNESS
[FreeTextEntry1] : 58 year old man with a history CAD s/p PCI to LAD in 2023,  HFpEF, HTN, morbid obesity, right paralyzed hemidiaphragm, chronic lymphedema. HAKAN on CPAP presents for an followup cardiac evaluation.   Since his last visit he is doing well. He has been more sedentary.  However, he is walking further than previous.  He has been monitoring his diet.  He   denies any chest pain, orthopnea, PND, near syncope, syncope, stroke like symptoms. No reported melena, hematochezia or hematemesis Medication reconciliation performed. He is compliant with his medications.

## 2024-04-03 NOTE — REVIEW OF SYSTEMS
[Dyspnea on exertion] : dyspnea during exertion [Lower Ext Edema] : lower extremity edema [Orthopnea] : no orthopnea [Negative] : Heme/Lymph

## 2024-04-03 NOTE — PHYSICAL EXAM
[Normal] : normal venous pressure, no carotid bruit [Normal Rate] : normal [Rhythm Regular] : regular [Normal S1] : normal S1 [Normal S2] : normal S2 [Distant] : the heart sounds were distant [No Murmur] : no murmurs heard [___ +] : bilateral [unfilled]U+ pretibial pitting edema [Rt] : varicose veins of the right leg noted [Lt] : varicose veins of the left leg noted [2+] : left 2+ [Right Carotid Bruit] : no bruit heard over the right carotid [Left Carotid Bruit] : no bruit heard over the left carotid [Good Air Entry] : good air entry [Clear Lung Fields] : clear lung fields [Soft] : abdomen soft [No Respiratory Distress] : no respiratory distress  [Normal Gait] : normal gait [Non Tender] : non-tender [No Cyanosis] : no cyanosis [No Clubbing] : no clubbing [No Rash] : no rash [No Skin Lesions] : no skin lesions [Moves all extremities] : moves all extremities [No Focal Deficits] : no focal deficits [Alert and Oriented] : alert and oriented [Normal memory] : normal memory

## 2024-04-24 ENCOUNTER — APPOINTMENT (OUTPATIENT)
Dept: BARIATRICS | Facility: CLINIC | Age: 59
End: 2024-04-24
Payer: COMMERCIAL

## 2024-04-24 VITALS
TEMPERATURE: 97.1 F | DIASTOLIC BLOOD PRESSURE: 70 MMHG | HEIGHT: 69 IN | OXYGEN SATURATION: 95 % | HEART RATE: 64 BPM | WEIGHT: 315 LBS | SYSTOLIC BLOOD PRESSURE: 116 MMHG | BODY MASS INDEX: 46.65 KG/M2

## 2024-04-24 DIAGNOSIS — R63.8 OTHER SYMPTOMS AND SIGNS CONCERNING FOOD AND FLUID INTAKE: ICD-10-CM

## 2024-04-24 DIAGNOSIS — R63.2 POLYPHAGIA: ICD-10-CM

## 2024-04-24 DIAGNOSIS — Z87.01 PERSONAL HISTORY OF PNEUMONIA (RECURRENT): ICD-10-CM

## 2024-04-24 DIAGNOSIS — R63.5 ABNORMAL WEIGHT GAIN: ICD-10-CM

## 2024-04-24 DIAGNOSIS — Z76.89 PERSONS ENCOUNTERING HEALTH SERVICES IN OTHER SPECIFIED CIRCUMSTANCES: ICD-10-CM

## 2024-04-24 DIAGNOSIS — Z13.21 ENCOUNTER FOR SCREENING FOR NUTRITIONAL DISORDER: ICD-10-CM

## 2024-04-24 DIAGNOSIS — E46 UNSPECIFIED PROTEIN-CALORIE MALNUTRITION: ICD-10-CM

## 2024-04-24 DIAGNOSIS — Z86.39 PERSONAL HISTORY OF OTHER ENDOCRINE, NUTRITIONAL AND METABOLIC DISEASE: ICD-10-CM

## 2024-04-24 PROCEDURE — 99205 OFFICE O/P NEW HI 60 MIN: CPT

## 2024-04-30 LAB
ALBUMIN SERPL ELPH-MCNC: 4.1 G/DL
ALP BLD-CCNC: 107 U/L
ALT SERPL-CCNC: 23 U/L
ANION GAP SERPL CALC-SCNC: 17 MMOL/L
AST SERPL-CCNC: 23 U/L
BASOPHILS # BLD AUTO: 0.04 K/UL
BASOPHILS NFR BLD AUTO: 0.4 %
BILIRUB SERPL-MCNC: 0.7 MG/DL
BUN SERPL-MCNC: 21 MG/DL
CALCIUM SERPL-MCNC: 9.5 MG/DL
CHLORIDE SERPL-SCNC: 101 MMOL/L
CHOLEST SERPL-MCNC: 120 MG/DL
CO2 SERPL-SCNC: 24 MMOL/L
CREAT SERPL-MCNC: 1.21 MG/DL
EGFR: 69 ML/MIN/1.73M2
EOSINOPHIL # BLD AUTO: 0.07 K/UL
EOSINOPHIL NFR BLD AUTO: 0.7 %
GLUCOSE SERPL-MCNC: 84 MG/DL
HCT VFR BLD CALC: 48.7 %
HDLC SERPL-MCNC: 43 MG/DL
HGB BLD-MCNC: 15.9 G/DL
IMM GRANULOCYTES NFR BLD AUTO: 0.5 %
INSULIN P FAST SERPL-ACNC: 34.7 UU/ML
LDLC SERPL CALC-MCNC: 55 MG/DL
LYMPHOCYTES # BLD AUTO: 1.15 K/UL
LYMPHOCYTES NFR BLD AUTO: 11.1 %
MAN DIFF?: NORMAL
MCHC RBC-ENTMCNC: 29.4 PG
MCHC RBC-ENTMCNC: 32.6 GM/DL
MCV RBC AUTO: 90.2 FL
MONOCYTES # BLD AUTO: 0.78 K/UL
MONOCYTES NFR BLD AUTO: 7.5 %
NEUTROPHILS # BLD AUTO: 8.28 K/UL
NEUTROPHILS NFR BLD AUTO: 79.8 %
NONHDLC SERPL-MCNC: 78 MG/DL
PLATELET # BLD AUTO: 161 K/UL
POTASSIUM SERPL-SCNC: 4.2 MMOL/L
PROT SERPL-MCNC: 7.2 G/DL
RBC # BLD: 5.4 M/UL
RBC # FLD: 13.8 %
SODIUM SERPL-SCNC: 143 MMOL/L
TRIGL SERPL-MCNC: 126 MG/DL
TSH SERPL-ACNC: 1.86 UIU/ML
WBC # FLD AUTO: 10.37 K/UL

## 2024-04-30 NOTE — REVIEW OF SYSTEMS
[Patient Intake Form Reviewed] : Patient intake form was reviewed [Negative] : Allergic/Immunologic [Chest Pain] : no chest pain [Palpitations] : no palpitations [Leg Claudication] : no intermittent leg claudication [Lower Ext Edema] : no lower extremity edema [FreeTextEntry5] : swelling/edema

## 2024-04-30 NOTE — HISTORY OF PRESENT ILLNESS
[de-identified] : ANIL MCKNIGHT is a 58-year-old M with a long-standing Hx of obesity presents today for initial evaluation for weight management options. Pt has made significant dietary changes over the last year (i.e. eating less fast food/making better food choices) that yielded a 25 lb weight loss and improved mobility. The patient meets the criteria for surgery and the pt's health insurance company requires surgical consultation to make sure that the patient is aware of all weight loss options.    Reports no history of substance abuse, significant anxiety, uncontrolled blood pressure, kidney stones, or pancreatitis. Reports no family history of thyroid cancer or MEN 2 syndrome.   Heaviest/current wt 400 lbs, lowest wt 310 lbs. Goal weight: 215 lbs Diets/exercise programs tried in the past: Yes Weight loss medications tried in the past: No Reason for stopping weight loss medication if applicable: N/A   History of bariatric surgery: No Obesity comorbidities: HTN, HAKAN, heart failure Comorbidities improved/resolved: N/A Anti-obesity medication: None Obesity medication side effects: N/A   No abdominal pain, reflux, nausea, vomiting, constipation, or diarrhea.   Current dietary lifestyle: Breakfast: Greek yogurt with trail mix Lunch: Turkey sandwich, 2 apples, and a banana Dinner: Steak, Chicken, fish, vegetables and salad Snacks: [place], ice cream Drinks: Water (64 oz/day), Iced Tea (with artificial sweeteners), Coffee (with whole milk, x4/day)   Activity Lifestyle: Sleep: (on CPAP) Physical activity/exercise: Daily activity Work: Counselor Lives With: Wife and son (18 years old) Smoking/ETOH: Nonsmoker; occasional EtOH use

## 2024-04-30 NOTE — ASSESSMENT
[FreeTextEntry1] : Had a lengthy discussion with the patient regarding nutrition, exercise, weight loss medications, and bariatric surgery. The patient qualifies for bariatric surgery but is not interested at this time. We reviewed surgical options such as the gastric Chantell-en-Y bypass and Sleeve Gastrectomy, and the risks and benefits of each. We discussed how bariatric surgery may offer greater weight loss results with better long-term success. The patient qualifies for and is currently most interested in weight loss medications. The following risks of bariatric surgery were discussed with the patient with diagrams. All questions answered.   Complications were discussed including but not limited to: vitamin and protein deficiencies, pneumonia, urinary infection, wound infection, leaks/peritonitis possibly requiring intraabdominal drains or reoperation, bleeding, DVT, pulmonary embolus, severe reflux, sleeve obstruction, anastomotic stricture, anastomotic ulcer, abdominal wall hernias, gallstone formation, revisions, death, inadequate weight loss. The importance of vitamins and protein supplementation was stressed, as was the importance of follow-up and exercise.   Health maintenance and behavioral/nutrition counseling for obesity: An additional 30 minutes of the visit was spent counseling the patient regarding need for aggressive weight loss and behavior modification including nutrition and proper eating habits, including which foods to eat and avoid.   Emphasized the importance of making healthier food choices including fresh fruits and vegetables, lean meats, and protein sources. Recommended front loading calories, incorporating whole grains, and eliminating fast foods. Discussed the importance of avoiding fried/fatty foods and foods containing high sugar content including juices/shakes/sodas/desserts.   Encouraged beginning an exercise program and recommended cardiovascular exercises along with strength training to build lean muscle. Made suggestions on different types of exercises to try.   Patient will work on the following:   -Meet with nutritionist -Eliminate snacks -Avoid artificial sweeteners -Eliminate liquid calories -Focus on eating 3 well-balanced meals during the daytime with appropriate portion size -Limit nuts (e.g. 8-12 almonds); consider sweetening Greek yogurt with berries instead -Try to make vegetables largest portion of plate -Cooking fresh meals rather than take out/processed/ready-made foods -Try to choose lean meats like Chicken, Turkey, and fish, instead of fattier meats like beef and pork -Incorporating exercise (e.g. walking, stationary bike) -Use a step counter   Discussed initiating Wegovy therapy for pt. Currently there are no contraindications for the use of Wegovy after reviewing the pt's medical history and labs, including personal and family history of thyroid cancer or MEN 2 Syndrome. Possible side effects of Wegovy were discussed with the patient, including nausea, reflux, constipation, delayed gastric emptying, and pancreatitis. Pt verbalizes understanding and wishes to proceed with medical therapy. Instructions for use provided.   Start Wegovy based on blood work (ordered at today's visit) and authorization   Patient educated to call with questions/concerns   All questions answered   Schedule follow-up for 3 weeks after starting weight loss medication.   Additional time spent before and after visit reviewing chart.

## 2024-04-30 NOTE — PHYSICAL EXAM
[Obese, well nourished, in no acute distress] : obese, well nourished, in no acute distress [Normal] : affect appropriate [de-identified] : soft, NT, ND, no evidence of hernia; diastasis observed; well-healed incisions from prior cholecystectomy; obese

## 2024-05-22 ENCOUNTER — TRANSCRIPTION ENCOUNTER (OUTPATIENT)
Age: 59
End: 2024-05-22

## 2024-05-28 ENCOUNTER — APPOINTMENT (OUTPATIENT)
Dept: BARIATRICS | Facility: CLINIC | Age: 59
End: 2024-05-28
Payer: COMMERCIAL

## 2024-05-28 ENCOUNTER — TRANSCRIPTION ENCOUNTER (OUTPATIENT)
Age: 59
End: 2024-05-28

## 2024-05-28 VITALS
HEIGHT: 69 IN | HEART RATE: 60 BPM | OXYGEN SATURATION: 97 % | SYSTOLIC BLOOD PRESSURE: 110 MMHG | TEMPERATURE: 97.1 F | DIASTOLIC BLOOD PRESSURE: 70 MMHG | WEIGHT: 315 LBS | BODY MASS INDEX: 46.65 KG/M2

## 2024-05-28 DIAGNOSIS — E66.01 MORBID (SEVERE) OBESITY DUE TO EXCESS CALORIES: ICD-10-CM

## 2024-05-28 PROCEDURE — 99214 OFFICE O/P EST MOD 30 MIN: CPT

## 2024-05-28 RX ORDER — SEMAGLUTIDE 0.5 MG/.5ML
0.5 INJECTION, SOLUTION SUBCUTANEOUS
Qty: 1 | Refills: 0 | Status: ACTIVE | COMMUNITY
Start: 2024-04-30 | End: 1900-01-01

## 2024-05-28 RX ORDER — SEMAGLUTIDE 0.25 MG/.5ML
0.25 INJECTION, SOLUTION SUBCUTANEOUS
Qty: 1 | Refills: 0 | Status: DISCONTINUED | COMMUNITY
Start: 2024-04-24 | End: 2024-05-28

## 2024-05-28 NOTE — PHYSICAL EXAM
[Obese, well nourished, in no acute distress] : obese, well nourished, in no acute distress [Normal] : affect appropriate [de-identified] : Equal chest rise, non-labored respirations, no audible wheezing. [de-identified] : soft, NT, ND, no evidence of hernia; diastasis observed; well-healed incisions from prior cholecystectomy; obese.

## 2024-05-28 NOTE — ASSESSMENT
[FreeTextEntry1] : 59-year-old M with a longstanding history of obesity presents for a weight-loss medication follow-up after having made nutrition and exercise changes since last visit. 3-pound weight loss since last visit 1 month ago.   Had a lengthy discussion with the patient regarding nutrition, exercise, weight loss medications.   Patient will work on the following: -Meet with nutritionist and follow up with nutrition classes -Eliminate snacks -Increase zero calorie fluid intake to at least 64 oz/day -Focus on eating 3 well-balanced meals during the daytime with appropriate portion size -Cooking fresh meals rather than take out/processed/ready-made foods -Incorporating exercise; walk 8-10k steps daily -Increase dosage of Wegovy to 0.5 mg/week -Pt had only 1 dose of Wegovy 0.25 mg and has been asymptomatic. He was instructed to give the office a call if he develops any side effects prior to starting Wegovy 0.5 mg.   Pt verbalized understanding of the above Pt will call office immediately for any side effects. Pt verbalizes understanding and wishes to proceed with medical therapy. Patient educated to call with questions/concerns. All questions answered.   Return to office in 1 month   Additional time spent before and after visit reviewing chart.  Pt seen with Dr Ponce

## 2024-05-28 NOTE — HISTORY OF PRESENT ILLNESS
[de-identified] : 59-year-old M with a longstanding history of obesity presents for a weight-loss medication follow-up after having made nutrition and exercise changes since last visit. 3-pound weight loss since last visit 1 month ago. He took his first dose on 5/25/24. Reports no abdominal pain, nausea/vomiting, constipation, diarrhea, reflux/heartburn.   Patient trying to eat 3 protein-rich meals/day (B: greek yogurt, piece of fruit, L: fruit, cheese, crackers, D: steak, chicken, fish, veggies, occasionally pasta) and rarely snacking on popcorn, drinking adequate zero-calorie fluids/day, and not currently exercising; sleeps 8-9 hrs/night. Pt is interested in increasing dosage of medication   Starting weight at initial consult: 376 lbs Current weight at today's visit: 373 lbs   Anti-obesity medication(s): Wegovy Start date: 5/25/24 Current dose: 0.25 mg/week Side-effects from anti-obesity medication(s): none Obesity comorbidities: HTN, HAKAN, heart failure Comorbidities improved/resolved: no History of bariatric surgery: no   Next nutritionist appointment to be scheduled

## 2024-07-01 ENCOUNTER — NON-APPOINTMENT (OUTPATIENT)
Age: 59
End: 2024-07-01

## 2024-07-03 ENCOUNTER — APPOINTMENT (OUTPATIENT)
Dept: BARIATRICS | Facility: CLINIC | Age: 59
End: 2024-07-03
Payer: COMMERCIAL

## 2024-07-03 VITALS
DIASTOLIC BLOOD PRESSURE: 70 MMHG | BODY MASS INDEX: 45.1 KG/M2 | TEMPERATURE: 97.1 F | HEART RATE: 51 BPM | SYSTOLIC BLOOD PRESSURE: 116 MMHG | WEIGHT: 315 LBS | OXYGEN SATURATION: 93 % | HEIGHT: 70 IN

## 2024-07-03 DIAGNOSIS — Z71.82 EXERCISE COUNSELING: ICD-10-CM

## 2024-07-03 DIAGNOSIS — R63.8 OTHER SYMPTOMS AND SIGNS CONCERNING FOOD AND FLUID INTAKE: ICD-10-CM

## 2024-07-03 DIAGNOSIS — E66.01 MORBID (SEVERE) OBESITY DUE TO EXCESS CALORIES: ICD-10-CM

## 2024-07-03 DIAGNOSIS — R63.4 ABNORMAL WEIGHT LOSS: ICD-10-CM

## 2024-07-03 DIAGNOSIS — K59.09 OTHER CONSTIPATION: ICD-10-CM

## 2024-07-03 DIAGNOSIS — Z79.899 OTHER LONG TERM (CURRENT) DRUG THERAPY: ICD-10-CM

## 2024-07-03 PROCEDURE — G2211 COMPLEX E/M VISIT ADD ON: CPT

## 2024-07-03 PROCEDURE — 99214 OFFICE O/P EST MOD 30 MIN: CPT

## 2024-07-03 RX ORDER — SEMAGLUTIDE 1 MG/.5ML
1 INJECTION, SOLUTION SUBCUTANEOUS
Qty: 1 | Refills: 0 | Status: ACTIVE | COMMUNITY
Start: 2024-07-03 | End: 1900-01-01

## 2024-08-05 ENCOUNTER — NON-APPOINTMENT (OUTPATIENT)
Age: 59
End: 2024-08-05

## 2024-08-05 ENCOUNTER — APPOINTMENT (OUTPATIENT)
Dept: CARDIOLOGY | Facility: CLINIC | Age: 59
End: 2024-08-05

## 2024-08-05 PROCEDURE — G2211 COMPLEX E/M VISIT ADD ON: CPT

## 2024-08-05 PROCEDURE — 93000 ELECTROCARDIOGRAM COMPLETE: CPT

## 2024-08-05 PROCEDURE — 99214 OFFICE O/P EST MOD 30 MIN: CPT

## 2024-08-05 NOTE — HISTORY OF PRESENT ILLNESS
[FreeTextEntry1] : 59 year old man with a history CAD s/p PCI to LAD in 2023,  HFpEF, HTN, morbid obesity, right paralyzed hemidiaphragm, chronic lymphedema. HAKAN on CPAP presents for an followup cardiac evaluation.   Since his last visit he is doing well. He has started on Wegovy. He lost >30 lbs. he has been much more active. He is golfing more and starting to swim more.  He   denies any chest pain, orthopnea, PND, near syncope, syncope, stroke like symptoms. No reported melena, hematochezia or hematemesis Medication reconciliation performed. He is compliant with his medications.

## 2024-08-05 NOTE — DISCUSSION/SUMMARY
[FreeTextEntry1] : 59 year man with a history as listed [facility].  Fortunato is doing well. He denies any anginal symptoms. Clinically he is euvolemic on exam. His EKG did not reveal any significant ischemic changes.  He will continue DAPT for till July. He will continue with statin therapy to achieve maintain goal LDL<100 or ideally <70.  He will continue  toprol 25mg Qday. His PVCs have resolved.   He will continue torsemide Continue Farxiga 10mg Qday.  He will continue Losartan 100mg Qday.  He will continue CPAP.  Exercise and diet counseling was performed in order to reduce her future cardiovascular risk.  Obesity is playing a large component of it. He will continue GLP1.  He will follow-up with me in 3 months or sooner if necessary.   [EKG obtained to assist in diagnosis and management of assessed problem(s)] : EKG obtained to assist in diagnosis and management of assessed problem(s)

## 2024-08-05 NOTE — CARDIOLOGY SUMMARY
[de-identified] : Sinus Bradycardia  -Diffuse nonspecific T-abnormality.    [de-identified] : 2021 ([facility]) small defect presents in the mid anterolateral will may be 2/2 artifact but ischemia cannot be ruled  [de-identified] : 5/17/23 normal LV function, mild LVH [de-identified] : 7/2023 s/p LAD stent - radially

## 2024-08-29 ENCOUNTER — APPOINTMENT (OUTPATIENT)
Dept: BARIATRICS | Facility: CLINIC | Age: 59
End: 2024-08-29
Payer: COMMERCIAL

## 2024-08-29 VITALS
OXYGEN SATURATION: 95 % | HEART RATE: 53 BPM | HEIGHT: 70 IN | WEIGHT: 315 LBS | BODY MASS INDEX: 45.1 KG/M2 | TEMPERATURE: 97.5 F

## 2024-08-29 DIAGNOSIS — E46 UNSPECIFIED PROTEIN-CALORIE MALNUTRITION: ICD-10-CM

## 2024-08-29 DIAGNOSIS — R63.5 ABNORMAL WEIGHT GAIN: ICD-10-CM

## 2024-08-29 DIAGNOSIS — R63.2 POLYPHAGIA: ICD-10-CM

## 2024-08-29 DIAGNOSIS — R63.4 ABNORMAL WEIGHT LOSS: ICD-10-CM

## 2024-08-29 DIAGNOSIS — E56.9 VITAMIN DEFICIENCY, UNSPECIFIED: ICD-10-CM

## 2024-08-29 DIAGNOSIS — Z86.39 PERSONAL HISTORY OF OTHER ENDOCRINE, NUTRITIONAL AND METABOLIC DISEASE: ICD-10-CM

## 2024-08-29 DIAGNOSIS — R79.9 ABNORMAL FINDING OF BLOOD CHEMISTRY, UNSPECIFIED: ICD-10-CM

## 2024-08-29 DIAGNOSIS — R63.8 OTHER SYMPTOMS AND SIGNS CONCERNING FOOD AND FLUID INTAKE: ICD-10-CM

## 2024-08-29 DIAGNOSIS — Z01.812 ENCOUNTER FOR PREPROCEDURAL LABORATORY EXAMINATION: ICD-10-CM

## 2024-08-29 DIAGNOSIS — E66.01 MORBID (SEVERE) OBESITY DUE TO EXCESS CALORIES: ICD-10-CM

## 2024-08-29 DIAGNOSIS — E61.8 DEFICIENCY OF OTHER SPECIFIED NUTRIENT ELEMENTS: ICD-10-CM

## 2024-08-29 DIAGNOSIS — Z00.00 ENCOUNTER FOR GENERAL ADULT MEDICAL EXAMINATION W/OUT ABNORMAL FINDINGS: ICD-10-CM

## 2024-08-29 PROCEDURE — 99214 OFFICE O/P EST MOD 30 MIN: CPT

## 2024-08-29 NOTE — PHYSICAL EXAM
[Obese, well nourished, in no acute distress] : obese, well nourished, in no acute distress [Normal] : supple without JVD, no thyromegaly or masses appreciated [de-identified] : Well, healthy appearing adult  [de-identified] : EOMI, no conjunctival injection, anicteric  [de-identified] : Equal chest rise, non-labored respirations, no audible wheezing  [de-identified] : soft, NT, ND, no hernias but diastasis noted, incisions from cholecystectomy well healed  [de-identified] : LISA

## 2024-08-29 NOTE — ASSESSMENT
[FreeTextEntry1] : 59-year-old M with a long-standing h/o obesity presents for a weight management follow-up. 10 lb weight loss since last visit one month ago; on Wegovy 1.7 mg/week. Nutrition and exercise guidelines reviewed with the patient.    Encourage 3 protein focused meals/day (aim for 60-70 g protein/day) Try to frontload calories earlier in the day Avoid processed/refined foods Aim for 64 oz water/day with an additional 2 cups of water for every cup of caffeinated beverage  Encourage 150 minutes of exercise /week incorporating both Cardio- and strength-training as tolerated Daily goal 8-10k steps/day; track steps Last Labs done 4/2024; next fasting labs to be done October 2024. Ordered at today's visit. Increase dose of Wegovy to 2.4 mg/week; call the office right away with any side effects  Follow-up in 6-8 weeks  All questions answered   Additional time spent before and after visit reviewing chart

## 2024-08-29 NOTE — HISTORY OF PRESENT ILLNESS
[de-identified] : 59-year-old M with a long-standing h/o obesity presents for a weight management follow-up. 10 lb weight loss since last visit one month ago; on Wegovy 1.7 mg/week. Reports constipation (resolved by increasing water intake); reports no abdominal pain, nausea/vomiting, diarrhea, reflux/heartburn, nor visual changes. Patient eating 3 protein-rich meals/day, drinking adequate zero-calorie fluids/day, and exercising.    Starting weight at initial consult: 376 lbs (initial consult 4/24/2024) Current weight at today's visit: 356 lbs  Anti-obesity medication(s): Wegovy Start date: 5/25/2024 Current dose: 1.7 mg (injection #4 7/6/2024) Side-effects from anti-obesity medication(s): mild constipation (resolved by increasing water intake) Obesity comorbidities: HTN, HAKAN, heart failure Comorbidities improved/resolved: N/A History of bariatric surgery: N

## 2024-08-29 NOTE — HISTORY OF PRESENT ILLNESS
[de-identified] : 59-year-old M with a long-standing h/o obesity presents for a weight management follow-up. 10 lb weight loss since last visit one month ago; on Wegovy 1.7 mg/week. Reports constipation (resolved by increasing water intake); reports no abdominal pain, nausea/vomiting, diarrhea, reflux/heartburn, nor visual changes. Patient eating 3 protein-rich meals/day, drinking adequate zero-calorie fluids/day, and exercising.    Starting weight at initial consult: 376 lbs (initial consult 4/24/2024) Current weight at today's visit: 356 lbs  Anti-obesity medication(s): Wegovy Start date: 5/25/2024 Current dose: 1.7 mg (injection #4 7/6/2024) Side-effects from anti-obesity medication(s): mild constipation (resolved by increasing water intake) Obesity comorbidities: HTN, HAKAN, heart failure Comorbidities improved/resolved: N/A History of bariatric surgery: N

## 2024-08-29 NOTE — PHYSICAL EXAM
[Obese, well nourished, in no acute distress] : obese, well nourished, in no acute distress [Normal] : supple without JVD, no thyromegaly or masses appreciated [de-identified] : Well, healthy appearing adult  [de-identified] : EOMI, no conjunctival injection, anicteric  [de-identified] : Equal chest rise, non-labored respirations, no audible wheezing  [de-identified] : soft, NT, ND, no hernias but diastasis noted, incisions from cholecystectomy well healed  [de-identified] : LISA

## 2024-10-02 ENCOUNTER — APPOINTMENT (OUTPATIENT)
Dept: INTERNAL MEDICINE | Facility: CLINIC | Age: 59
End: 2024-10-02
Payer: COMMERCIAL

## 2024-10-02 VITALS
BODY MASS INDEX: 45.1 KG/M2 | SYSTOLIC BLOOD PRESSURE: 122 MMHG | DIASTOLIC BLOOD PRESSURE: 70 MMHG | OXYGEN SATURATION: 92 % | HEART RATE: 50 BPM | WEIGHT: 315 LBS | HEIGHT: 70 IN | RESPIRATION RATE: 16 BRPM

## 2024-10-02 DIAGNOSIS — Z23 ENCOUNTER FOR IMMUNIZATION: ICD-10-CM

## 2024-10-02 DIAGNOSIS — E66.01 MORBID (SEVERE) OBESITY DUE TO EXCESS CALORIES: ICD-10-CM

## 2024-10-02 DIAGNOSIS — I10 ESSENTIAL (PRIMARY) HYPERTENSION: ICD-10-CM

## 2024-10-02 DIAGNOSIS — I25.10 ATHEROSCLEROTIC HEART DISEASE OF NATIVE CORONARY ARTERY W/OUT ANGINA PECTORIS: ICD-10-CM

## 2024-10-02 PROCEDURE — 90656 IIV3 VACC NO PRSV 0.5 ML IM: CPT

## 2024-10-02 PROCEDURE — 99204 OFFICE O/P NEW MOD 45 MIN: CPT | Mod: 25

## 2024-10-02 PROCEDURE — G0008: CPT

## 2024-10-02 NOTE — HEALTH RISK ASSESSMENT
[0] : 2) Feeling down, depressed, or hopeless: Not at all (0) [PHQ-2 Negative - No further assessment needed] : PHQ-2 Negative - No further assessment needed [Never] : Never [[id number]] : 0

## 2024-10-02 NOTE — HISTORY OF PRESENT ILLNESS
[FreeTextEntry1] : New pt [de-identified] : 59 year old M comes to establish medical care. Previously followed by Dr. Marnie Mendoza ([facility]). He had physical few months ago.   Medical history and medications reviewed with patient. He is followed with cardiologist Dr. Rodriguez. He has hx of CAD s/p stent and CHF. He is on Wegovy and has lost significant amount of weight so far. Followed with bariatric surgery.  He has hereditary LLE lymphedema. Has lymphedema pump at home.

## 2024-10-02 NOTE — PLAN
[FreeTextEntry1] : Medical history and medications reviewed. His BP is controlled.  Continue current medications.  Followed with cardiology Dr. Rodriguez. He has lost weight on Wegovy. Followed with bariatric team. He is not interested in bariatric surgery at this time. Check fasting labs at next visit. Flu shot given today.  RTO 3 months fasting.

## 2024-10-02 NOTE — PHYSICAL EXAM
[Normal] : normal rate, regular rhythm, normal S1 and S2 and no murmur heard [Soft] : abdomen soft [Non Tender] : non-tender [No Rash] : no rash [Normal Mood] : the mood was normal [de-identified] : friendly [de-identified] : LLE lymphedema - chronic

## 2024-10-02 NOTE — REVIEW OF SYSTEMS
[Negative] : Heme/Lymph [Recent Change In Weight] : ~T recent weight change [FreeTextEntry2] : weight loss

## 2024-10-10 ENCOUNTER — RX RENEWAL (OUTPATIENT)
Age: 59
End: 2024-10-10

## 2024-10-21 ENCOUNTER — APPOINTMENT (OUTPATIENT)
Dept: BARIATRICS | Facility: CLINIC | Age: 59
End: 2024-10-21
Payer: COMMERCIAL

## 2024-10-21 VITALS
DIASTOLIC BLOOD PRESSURE: 72 MMHG | TEMPERATURE: 97.7 F | WEIGHT: 315 LBS | OXYGEN SATURATION: 90 % | HEIGHT: 70 IN | SYSTOLIC BLOOD PRESSURE: 128 MMHG | BODY MASS INDEX: 45.1 KG/M2 | HEART RATE: 47 BPM

## 2024-10-21 DIAGNOSIS — E66.01 MORBID (SEVERE) OBESITY DUE TO EXCESS CALORIES: ICD-10-CM

## 2024-10-21 PROCEDURE — 99214 OFFICE O/P EST MOD 30 MIN: CPT

## 2024-11-18 ENCOUNTER — RX RENEWAL (OUTPATIENT)
Age: 59
End: 2024-11-18

## 2024-11-25 ENCOUNTER — RX RENEWAL (OUTPATIENT)
Age: 59
End: 2024-11-25

## 2024-12-02 ENCOUNTER — APPOINTMENT (OUTPATIENT)
Dept: CARDIOLOGY | Facility: CLINIC | Age: 59
End: 2024-12-02
Payer: COMMERCIAL

## 2024-12-02 VITALS
HEART RATE: 48 BPM | BODY MASS INDEX: 45.1 KG/M2 | DIASTOLIC BLOOD PRESSURE: 76 MMHG | OXYGEN SATURATION: 93 % | HEIGHT: 70 IN | WEIGHT: 315 LBS | SYSTOLIC BLOOD PRESSURE: 112 MMHG

## 2024-12-02 DIAGNOSIS — I49.3 VENTRICULAR PREMATURE DEPOLARIZATION: ICD-10-CM

## 2024-12-02 DIAGNOSIS — I25.10 ATHEROSCLEROTIC HEART DISEASE OF NATIVE CORONARY ARTERY W/OUT ANGINA PECTORIS: ICD-10-CM

## 2024-12-02 DIAGNOSIS — I50.30 UNSPECIFIED DIASTOLIC (CONGESTIVE) HEART FAILURE: ICD-10-CM

## 2024-12-02 PROCEDURE — 93000 ELECTROCARDIOGRAM COMPLETE: CPT

## 2024-12-02 PROCEDURE — G2211 COMPLEX E/M VISIT ADD ON: CPT

## 2024-12-02 PROCEDURE — 99214 OFFICE O/P EST MOD 30 MIN: CPT

## 2024-12-03 ENCOUNTER — TRANSCRIPTION ENCOUNTER (OUTPATIENT)
Age: 59
End: 2024-12-03

## 2024-12-04 ENCOUNTER — TRANSCRIPTION ENCOUNTER (OUTPATIENT)
Age: 59
End: 2024-12-04

## 2024-12-13 ENCOUNTER — TRANSCRIPTION ENCOUNTER (OUTPATIENT)
Age: 59
End: 2024-12-13

## 2024-12-20 ENCOUNTER — NON-APPOINTMENT (OUTPATIENT)
Age: 59
End: 2024-12-20

## 2025-01-06 ENCOUNTER — RX RENEWAL (OUTPATIENT)
Age: 60
End: 2025-01-06

## 2025-01-06 ENCOUNTER — APPOINTMENT (OUTPATIENT)
Dept: INTERNAL MEDICINE | Facility: CLINIC | Age: 60
End: 2025-01-06
Payer: COMMERCIAL

## 2025-01-06 VITALS
SYSTOLIC BLOOD PRESSURE: 96 MMHG | BODY MASS INDEX: 45.1 KG/M2 | HEIGHT: 70 IN | WEIGHT: 315 LBS | HEART RATE: 46 BPM | OXYGEN SATURATION: 94 % | DIASTOLIC BLOOD PRESSURE: 70 MMHG | TEMPERATURE: 97.6 F

## 2025-01-06 VITALS — DIASTOLIC BLOOD PRESSURE: 60 MMHG | SYSTOLIC BLOOD PRESSURE: 94 MMHG

## 2025-01-06 DIAGNOSIS — I50.30 UNSPECIFIED DIASTOLIC (CONGESTIVE) HEART FAILURE: ICD-10-CM

## 2025-01-06 DIAGNOSIS — I10 ESSENTIAL (PRIMARY) HYPERTENSION: ICD-10-CM

## 2025-01-06 DIAGNOSIS — I25.10 ATHEROSCLEROTIC HEART DISEASE OF NATIVE CORONARY ARTERY W/OUT ANGINA PECTORIS: ICD-10-CM

## 2025-01-06 PROCEDURE — 99214 OFFICE O/P EST MOD 30 MIN: CPT

## 2025-01-06 PROCEDURE — G2211 COMPLEX E/M VISIT ADD ON: CPT

## 2025-01-06 RX ORDER — POTASSIUM CHLORIDE 750 MG/1
10 TABLET, FILM COATED, EXTENDED RELEASE ORAL
Qty: 90 | Refills: 3 | Status: ACTIVE | COMMUNITY

## 2025-01-11 ENCOUNTER — TRANSCRIPTION ENCOUNTER (OUTPATIENT)
Age: 60
End: 2025-01-11

## 2025-01-14 ENCOUNTER — APPOINTMENT (OUTPATIENT)
Dept: BARIATRICS | Facility: CLINIC | Age: 60
End: 2025-01-14
Payer: COMMERCIAL

## 2025-01-14 VITALS
OXYGEN SATURATION: 92 % | HEART RATE: 48 BPM | HEIGHT: 70 IN | SYSTOLIC BLOOD PRESSURE: 118 MMHG | BODY MASS INDEX: 45.1 KG/M2 | WEIGHT: 315 LBS | TEMPERATURE: 97.3 F | DIASTOLIC BLOOD PRESSURE: 72 MMHG

## 2025-01-14 DIAGNOSIS — E66.01 MORBID (SEVERE) OBESITY DUE TO EXCESS CALORIES: ICD-10-CM

## 2025-01-14 DIAGNOSIS — R63.4 ABNORMAL WEIGHT LOSS: ICD-10-CM

## 2025-01-14 DIAGNOSIS — Z79.899 OTHER LONG TERM (CURRENT) DRUG THERAPY: ICD-10-CM

## 2025-01-14 PROCEDURE — 99214 OFFICE O/P EST MOD 30 MIN: CPT

## 2025-01-30 NOTE — DISCHARGE NOTE PROVIDER - NSDCQMERRANDS_GEN_ALL_CORE
[FreeTextEntry1] : Ms. HERNANDEZ is a 75-year-old female who presents for follow up regarding GERD, Hiatal hernia.    Past Medical History is significant for HTN, DM, Neuropathy, Asthma, HLD,   1/19/25-1/24/25 [facility] Admission -Patient initially presented to ER with complaints of epigastric pain diffuse constant abdominal pain associated with nausea and vomiting. She underwent a CT abdomen and pelvis which revealed dilation of hiatal hernia with debris to the level of the aortic hiatus. The esophagus proximal to this is fluid-filled. There is high density return to the gallbladder compatible with vicarious excretion of contrast. She has history of Hiatal hernia/GERD s/p hiatal hernia repair (03/2021 in [place]) s/p redo hiatal hernia repair (10/2022) with Dr. John Ferrer followed by balloon dilation of lower esophageal sphincter 2/27/23. She was admitted to CTICU, electively intubated and underwent EGD which demonstrated large food burden proximal to wrap. She also underwent ERCP with evidence of chronic stasis and large hernia sac, which was approximately 7cm in diameter filled with food debris, impacted, obstructing the gastric outlet. She was ultimately extubated without issue. Transferred to floor, tolerated sips of water and ice chips and advanced to full liquids on 1/23. Patient was discharged home 1/24 with outpatient follow up.   1/19/25 EGD at [facility] -There was evidence in the mid-distal esophagus consistent with chronic stasis, there was a large hernia sac which was approximately 7 cm in diameter filled with food debris, impacted, obstructing the outlet to the gastric lumen, utilizing multiple devices enough of the material was carefully removed in order to advance the endoscope into the lower portion of the stomach. Using a TTS balloon dilator the fundoplication was dilated serially to a maximal diameter of 1.5 cm with minimal superficial mucosal tearing, despite attempts the retained material could not be entirely removed. The decision was made to place an NGT tube, which was confirmed endoscopically to be within the hernia sac and possibly beyond, could not visualize whether NGT enters into stomach or looped within the hernia sac.  1/18/25 CT Abdomen and Pelvis at [facility] -Small to moderate sized hiatal hernia, which is distended with enteric debris to the level of the diaphragmatic hiatus.  -Abnormal endometrial thickening measuring up to 1.1cm in AP thickness -Questionable subtle 8mm mildly hyper enhancing lesion within the trial of the pancreas.   I have reviewed the patient's medical records and diagnostic images at time of this consultation and have made the following recommendations: 1. 2.  All questions answered, patient verbalizes understanding and agrees to follow up accordingly.
No

## 2025-04-07 ENCOUNTER — APPOINTMENT (OUTPATIENT)
Dept: INTERNAL MEDICINE | Facility: CLINIC | Age: 60
End: 2025-04-07
Payer: COMMERCIAL

## 2025-04-07 VITALS
HEART RATE: 51 BPM | BODY MASS INDEX: 45.1 KG/M2 | TEMPERATURE: 98.1 F | WEIGHT: 315 LBS | HEIGHT: 70 IN | SYSTOLIC BLOOD PRESSURE: 104 MMHG | RESPIRATION RATE: 16 BRPM | DIASTOLIC BLOOD PRESSURE: 68 MMHG | OXYGEN SATURATION: 90 %

## 2025-04-07 VITALS — SYSTOLIC BLOOD PRESSURE: 102 MMHG | DIASTOLIC BLOOD PRESSURE: 62 MMHG

## 2025-04-07 DIAGNOSIS — I25.10 ATHEROSCLEROTIC HEART DISEASE OF NATIVE CORONARY ARTERY W/OUT ANGINA PECTORIS: ICD-10-CM

## 2025-04-07 DIAGNOSIS — G47.33 OBSTRUCTIVE SLEEP APNEA (ADULT) (PEDIATRIC): ICD-10-CM

## 2025-04-07 DIAGNOSIS — I10 ESSENTIAL (PRIMARY) HYPERTENSION: ICD-10-CM

## 2025-04-07 DIAGNOSIS — I50.30 UNSPECIFIED DIASTOLIC (CONGESTIVE) HEART FAILURE: ICD-10-CM

## 2025-04-07 PROCEDURE — G2211 COMPLEX E/M VISIT ADD ON: CPT

## 2025-04-07 PROCEDURE — 99214 OFFICE O/P EST MOD 30 MIN: CPT

## 2025-04-10 ENCOUNTER — APPOINTMENT (OUTPATIENT)
Dept: BARIATRICS | Facility: CLINIC | Age: 60
End: 2025-04-10
Payer: COMMERCIAL

## 2025-04-10 VITALS
HEIGHT: 70 IN | TEMPERATURE: 97.5 F | BODY MASS INDEX: 45.1 KG/M2 | WEIGHT: 315 LBS | DIASTOLIC BLOOD PRESSURE: 68 MMHG | OXYGEN SATURATION: 6 % | HEART RATE: 50 BPM | SYSTOLIC BLOOD PRESSURE: 110 MMHG

## 2025-04-10 DIAGNOSIS — Z79.899 OTHER LONG TERM (CURRENT) DRUG THERAPY: ICD-10-CM

## 2025-04-10 DIAGNOSIS — R63.4 ABNORMAL WEIGHT LOSS: ICD-10-CM

## 2025-04-10 DIAGNOSIS — K59.03 DRUG INDUCED CONSTIPATION: ICD-10-CM

## 2025-04-10 DIAGNOSIS — E66.01 MORBID (SEVERE) OBESITY DUE TO EXCESS CALORIES: ICD-10-CM

## 2025-04-10 DIAGNOSIS — Z76.0 ENCOUNTER FOR ISSUE OF REPEAT PRESCRIPTION: ICD-10-CM

## 2025-04-10 DIAGNOSIS — Z76.89 PERSONS ENCOUNTERING HEALTH SERVICES IN OTHER SPECIFIED CIRCUMSTANCES: ICD-10-CM

## 2025-04-10 PROCEDURE — G2211 COMPLEX E/M VISIT ADD ON: CPT

## 2025-04-10 PROCEDURE — 99214 OFFICE O/P EST MOD 30 MIN: CPT

## 2025-04-10 RX ORDER — POTASSIUM CHLORIDE 750 MG/1
10 TABLET, EXTENDED RELEASE ORAL
Qty: 90 | Refills: 0 | Status: ACTIVE | COMMUNITY
Start: 2025-01-30

## 2025-04-10 RX ORDER — DOXYCYCLINE HYCLATE 100 MG/1
100 CAPSULE ORAL
Qty: 14 | Refills: 0 | Status: ACTIVE | COMMUNITY
Start: 2024-12-20

## 2025-05-05 ENCOUNTER — NON-APPOINTMENT (OUTPATIENT)
Age: 60
End: 2025-05-05

## 2025-05-05 ENCOUNTER — APPOINTMENT (OUTPATIENT)
Dept: CARDIOLOGY | Facility: CLINIC | Age: 60
End: 2025-05-05
Payer: COMMERCIAL

## 2025-05-05 VITALS
SYSTOLIC BLOOD PRESSURE: 108 MMHG | HEART RATE: 42 BPM | BODY MASS INDEX: 59.47 KG/M2 | HEIGHT: 61 IN | DIASTOLIC BLOOD PRESSURE: 66 MMHG | OXYGEN SATURATION: 97 % | WEIGHT: 315 LBS

## 2025-05-05 DIAGNOSIS — I50.30 UNSPECIFIED DIASTOLIC (CONGESTIVE) HEART FAILURE: ICD-10-CM

## 2025-05-05 DIAGNOSIS — R00.1 BRADYCARDIA, UNSPECIFIED: ICD-10-CM

## 2025-05-05 PROCEDURE — 99214 OFFICE O/P EST MOD 30 MIN: CPT

## 2025-05-05 PROCEDURE — 93000 ELECTROCARDIOGRAM COMPLETE: CPT

## 2025-05-05 PROCEDURE — G2211 COMPLEX E/M VISIT ADD ON: CPT

## 2025-06-05 ENCOUNTER — MED ADMIN CHARGE (OUTPATIENT)
Age: 60
End: 2025-06-05

## 2025-06-05 ENCOUNTER — APPOINTMENT (OUTPATIENT)
Dept: CARDIOLOGY | Facility: CLINIC | Age: 60
End: 2025-06-05
Payer: COMMERCIAL

## 2025-06-05 PROCEDURE — 93306 TTE W/DOPPLER COMPLETE: CPT

## 2025-06-05 RX ADMIN — PERFLUTREN MG/ML: 6.52 INJECTION, SUSPENSION INTRAVENOUS at 00:00

## 2025-06-09 RX ORDER — PERFLUTREN 6.52 MG/ML
6.52 INJECTION, SUSPENSION INTRAVENOUS
Qty: 1 | Refills: 0 | Status: COMPLETED | OUTPATIENT
Start: 2025-06-05

## 2025-07-01 ENCOUNTER — APPOINTMENT (OUTPATIENT)
Dept: BARIATRICS | Facility: CLINIC | Age: 60
End: 2025-07-01

## 2025-07-01 ENCOUNTER — INPATIENT (INPATIENT)
Facility: HOSPITAL | Age: 60
LOS: 1 days | Discharge: HOME | DRG: 872 | End: 2025-07-03
Attending: INTERNAL MEDICINE | Admitting: INTERNAL MEDICINE
Payer: COMMERCIAL

## 2025-07-01 VITALS
TEMPERATURE: 98 F | SYSTOLIC BLOOD PRESSURE: 113 MMHG | HEART RATE: 112 BPM | HEIGHT: 70 IN | DIASTOLIC BLOOD PRESSURE: 76 MMHG | OXYGEN SATURATION: 95 % | RESPIRATION RATE: 19 BRPM | WEIGHT: 315 LBS

## 2025-07-01 DIAGNOSIS — I25.10 ATHEROSCLEROTIC HEART DISEASE OF NATIVE CORONARY ARTERY WITHOUT ANGINA PECTORIS: ICD-10-CM

## 2025-07-01 DIAGNOSIS — Z90.49 ACQUIRED ABSENCE OF OTHER SPECIFIED PARTS OF DIGESTIVE TRACT: Chronic | ICD-10-CM

## 2025-07-01 DIAGNOSIS — A41.89 OTHER SPECIFIED SEPSIS: ICD-10-CM

## 2025-07-01 DIAGNOSIS — I10 ESSENTIAL (PRIMARY) HYPERTENSION: ICD-10-CM

## 2025-07-01 DIAGNOSIS — G47.33 OBSTRUCTIVE SLEEP APNEA (ADULT) (PEDIATRIC): ICD-10-CM

## 2025-07-01 DIAGNOSIS — L03.116 CELLULITIS OF LEFT LOWER LIMB: ICD-10-CM

## 2025-07-01 LAB
ALBUMIN SERPL ELPH-MCNC: 3.5 G/DL — SIGNIFICANT CHANGE UP (ref 3.3–5)
ALP SERPL-CCNC: 96 U/L — SIGNIFICANT CHANGE UP (ref 40–120)
ALT FLD-CCNC: 27 U/L — SIGNIFICANT CHANGE UP (ref 12–78)
ANION GAP SERPL CALC-SCNC: 8 MMOL/L — SIGNIFICANT CHANGE UP (ref 5–17)
APTT BLD: 30.3 SEC — SIGNIFICANT CHANGE UP (ref 26.1–36.8)
AST SERPL-CCNC: 25 U/L — SIGNIFICANT CHANGE UP (ref 15–37)
BASOPHILS # BLD AUTO: 0.02 K/UL — SIGNIFICANT CHANGE UP (ref 0–0.2)
BASOPHILS NFR BLD AUTO: 0.1 % — SIGNIFICANT CHANGE UP (ref 0–2)
BILIRUB SERPL-MCNC: 1.9 MG/DL — HIGH (ref 0.2–1.2)
BUN SERPL-MCNC: 16 MG/DL — SIGNIFICANT CHANGE UP (ref 7–23)
CALCIUM SERPL-MCNC: 9.1 MG/DL — SIGNIFICANT CHANGE UP (ref 8.5–10.1)
CHLORIDE SERPL-SCNC: 106 MMOL/L — SIGNIFICANT CHANGE UP (ref 96–108)
CK MB BLD-MCNC: 0.7 % — SIGNIFICANT CHANGE UP (ref 0–3.5)
CK MB CFR SERPL CALC: 1.1 NG/ML — SIGNIFICANT CHANGE UP (ref 0–3.6)
CK SERPL-CCNC: 159 U/L — SIGNIFICANT CHANGE UP (ref 26–308)
CO2 SERPL-SCNC: 24 MMOL/L — SIGNIFICANT CHANGE UP (ref 22–31)
CREAT SERPL-MCNC: 1.1 MG/DL — SIGNIFICANT CHANGE UP (ref 0.5–1.3)
EGFR: 77 ML/MIN/1.73M2 — SIGNIFICANT CHANGE UP
EGFR: 77 ML/MIN/1.73M2 — SIGNIFICANT CHANGE UP
EOSINOPHIL # BLD AUTO: 0 K/UL — SIGNIFICANT CHANGE UP (ref 0–0.5)
EOSINOPHIL NFR BLD AUTO: 0 % — SIGNIFICANT CHANGE UP (ref 0–6)
GLUCOSE SERPL-MCNC: 96 MG/DL — SIGNIFICANT CHANGE UP (ref 70–99)
HCT VFR BLD CALC: 46.1 % — SIGNIFICANT CHANGE UP (ref 39–50)
HGB BLD-MCNC: 15.4 G/DL — SIGNIFICANT CHANGE UP (ref 13–17)
IMM GRANULOCYTES # BLD AUTO: 0.06 K/UL — SIGNIFICANT CHANGE UP (ref 0–0.07)
IMM GRANULOCYTES NFR BLD AUTO: 0.4 % — SIGNIFICANT CHANGE UP (ref 0–0.9)
INR BLD: 1.06 RATIO — SIGNIFICANT CHANGE UP (ref 0.85–1.16)
LACTATE SERPL-SCNC: 1.8 MMOL/L — SIGNIFICANT CHANGE UP (ref 0.7–2)
LACTATE SERPL-SCNC: 2.2 MMOL/L — HIGH (ref 0.7–2)
LYMPHOCYTES # BLD AUTO: 0.25 K/UL — LOW (ref 1–3.3)
LYMPHOCYTES NFR BLD AUTO: 1.7 % — LOW (ref 13–44)
MCHC RBC-ENTMCNC: 29.4 PG — SIGNIFICANT CHANGE UP (ref 27–34)
MCHC RBC-ENTMCNC: 33.4 G/DL — SIGNIFICANT CHANGE UP (ref 32–36)
MCV RBC AUTO: 88.1 FL — SIGNIFICANT CHANGE UP (ref 80–100)
MONOCYTES # BLD AUTO: 0.36 K/UL — SIGNIFICANT CHANGE UP (ref 0–0.9)
MONOCYTES NFR BLD AUTO: 2.5 % — SIGNIFICANT CHANGE UP (ref 2–14)
NEUTROPHILS # BLD AUTO: 13.77 K/UL — HIGH (ref 1.8–7.4)
NEUTROPHILS NFR BLD AUTO: 95.3 % — HIGH (ref 43–77)
NRBC # BLD AUTO: 0 K/UL — SIGNIFICANT CHANGE UP (ref 0–0)
NRBC # FLD: 0 K/UL — SIGNIFICANT CHANGE UP (ref 0–0)
NRBC BLD AUTO-RTO: 0 /100 WBCS — SIGNIFICANT CHANGE UP (ref 0–0)
PLATELET # BLD AUTO: 133 K/UL — LOW (ref 150–400)
PMV BLD: 13.5 FL — HIGH (ref 7–13)
POTASSIUM SERPL-MCNC: 3.5 MMOL/L — SIGNIFICANT CHANGE UP (ref 3.5–5.3)
POTASSIUM SERPL-SCNC: 3.5 MMOL/L — SIGNIFICANT CHANGE UP (ref 3.5–5.3)
PROT SERPL-MCNC: 8 G/DL — SIGNIFICANT CHANGE UP (ref 6–8.3)
PROTHROM AB SERPL-ACNC: 12.5 SEC — SIGNIFICANT CHANGE UP (ref 9.9–13.4)
RBC # BLD: 5.23 M/UL — SIGNIFICANT CHANGE UP (ref 4.2–5.8)
RBC # FLD: 13.2 % — SIGNIFICANT CHANGE UP (ref 10.3–14.5)
SODIUM SERPL-SCNC: 138 MMOL/L — SIGNIFICANT CHANGE UP (ref 135–145)
TROPONIN I, HIGH SENSITIVITY RESULT: 7.6 NG/L — SIGNIFICANT CHANGE UP
WBC # BLD: 14.46 K/UL — HIGH (ref 3.8–10.5)
WBC # FLD AUTO: 14.46 K/UL — HIGH (ref 3.8–10.5)

## 2025-07-01 PROCEDURE — 82553 CREATINE MB FRACTION: CPT

## 2025-07-01 PROCEDURE — 84484 ASSAY OF TROPONIN QUANT: CPT

## 2025-07-01 PROCEDURE — 94660 CPAP INITIATION&MGMT: CPT

## 2025-07-01 PROCEDURE — 85610 PROTHROMBIN TIME: CPT

## 2025-07-01 PROCEDURE — 36415 COLL VENOUS BLD VENIPUNCTURE: CPT

## 2025-07-01 PROCEDURE — 71045 X-RAY EXAM CHEST 1 VIEW: CPT

## 2025-07-01 PROCEDURE — 99223 1ST HOSP IP/OBS HIGH 75: CPT

## 2025-07-01 PROCEDURE — 83605 ASSAY OF LACTIC ACID: CPT

## 2025-07-01 PROCEDURE — 94760 N-INVAS EAR/PLS OXIMETRY 1: CPT

## 2025-07-01 PROCEDURE — 85730 THROMBOPLASTIN TIME PARTIAL: CPT

## 2025-07-01 PROCEDURE — 73552 X-RAY EXAM OF FEMUR 2/>: CPT | Mod: 26,LT

## 2025-07-01 PROCEDURE — 71045 X-RAY EXAM CHEST 1 VIEW: CPT | Mod: 26

## 2025-07-01 PROCEDURE — P9045: CPT

## 2025-07-01 PROCEDURE — 73590 X-RAY EXAM OF LOWER LEG: CPT

## 2025-07-01 PROCEDURE — 93970 EXTREMITY STUDY: CPT | Mod: 26

## 2025-07-01 PROCEDURE — 73552 X-RAY EXAM OF FEMUR 2/>: CPT

## 2025-07-01 PROCEDURE — 93970 EXTREMITY STUDY: CPT

## 2025-07-01 PROCEDURE — 73701 CT LOWER EXTREMITY W/DYE: CPT

## 2025-07-01 PROCEDURE — 82550 ASSAY OF CK (CPK): CPT

## 2025-07-01 PROCEDURE — 73590 X-RAY EXAM OF LOWER LEG: CPT | Mod: 26,LT

## 2025-07-01 PROCEDURE — 93005 ELECTROCARDIOGRAM TRACING: CPT

## 2025-07-01 PROCEDURE — 73701 CT LOWER EXTREMITY W/DYE: CPT | Mod: 26,LT

## 2025-07-01 PROCEDURE — 99285 EMERGENCY DEPT VISIT HI MDM: CPT

## 2025-07-01 PROCEDURE — 80053 COMPREHEN METABOLIC PANEL: CPT

## 2025-07-01 PROCEDURE — 87040 BLOOD CULTURE FOR BACTERIA: CPT

## 2025-07-01 PROCEDURE — 85025 COMPLETE CBC W/AUTO DIFF WBC: CPT

## 2025-07-01 RX ORDER — MIDODRINE HYDROCHLORIDE 5 MG/1
10 TABLET ORAL ONCE
Refills: 0 | Status: COMPLETED | OUTPATIENT
Start: 2025-07-01 | End: 2025-07-01

## 2025-07-01 RX ORDER — ONDANSETRON HCL/PF 4 MG/2 ML
4 VIAL (ML) INJECTION EVERY 6 HOURS
Refills: 0 | Status: DISCONTINUED | OUTPATIENT
Start: 2025-07-01 | End: 2025-07-03

## 2025-07-01 RX ORDER — PIPERACILLIN-TAZO-DEXTROSE,ISO 3.375G/5
3.38 IV SOLUTION, PIGGYBACK PREMIX FROZEN(ML) INTRAVENOUS EVERY 8 HOURS
Refills: 0 | Status: DISCONTINUED | OUTPATIENT
Start: 2025-07-01 | End: 2025-07-03

## 2025-07-01 RX ORDER — MELATONIN 5 MG
3 TABLET ORAL AT BEDTIME
Refills: 0 | Status: DISCONTINUED | OUTPATIENT
Start: 2025-07-01 | End: 2025-07-03

## 2025-07-01 RX ORDER — DAPAGLIFLOZIN 5 MG/1
1 TABLET, FILM COATED ORAL
Refills: 0 | DISCHARGE

## 2025-07-01 RX ORDER — PIPERACILLIN-TAZO-DEXTROSE,ISO 3.375G/5
3.38 IV SOLUTION, PIGGYBACK PREMIX FROZEN(ML) INTRAVENOUS ONCE
Refills: 0 | Status: COMPLETED | OUTPATIENT
Start: 2025-07-01 | End: 2025-07-01

## 2025-07-01 RX ORDER — ALBUMIN (HUMAN) 12.5 G/50ML
250 INJECTION, SOLUTION INTRAVENOUS ONCE
Refills: 0 | Status: COMPLETED | OUTPATIENT
Start: 2025-07-01 | End: 2025-07-01

## 2025-07-01 RX ORDER — ACETAMINOPHEN 500 MG/5ML
650 LIQUID (ML) ORAL EVERY 6 HOURS
Refills: 0 | Status: DISCONTINUED | OUTPATIENT
Start: 2025-07-01 | End: 2025-07-03

## 2025-07-01 RX ORDER — ASPIRIN 325 MG
81 TABLET ORAL DAILY
Refills: 0 | Status: DISCONTINUED | OUTPATIENT
Start: 2025-07-01 | End: 2025-07-03

## 2025-07-01 RX ORDER — PIPERACILLIN-TAZO-DEXTROSE,ISO 3.375G/5
3.38 IV SOLUTION, PIGGYBACK PREMIX FROZEN(ML) INTRAVENOUS ONCE
Refills: 0 | Status: DISCONTINUED | OUTPATIENT
Start: 2025-07-01 | End: 2025-07-01

## 2025-07-01 RX ORDER — MAGNESIUM, ALUMINUM HYDROXIDE 200-200 MG
30 TABLET,CHEWABLE ORAL EVERY 4 HOURS
Refills: 0 | Status: DISCONTINUED | OUTPATIENT
Start: 2025-07-01 | End: 2025-07-03

## 2025-07-01 RX ORDER — SODIUM CHLORIDE 9 G/1000ML
1000 INJECTION, SOLUTION INTRAVENOUS
Refills: 0 | Status: DISCONTINUED | OUTPATIENT
Start: 2025-07-01 | End: 2025-07-01

## 2025-07-01 RX ORDER — ACETAMINOPHEN 500 MG/5ML
1000 LIQUID (ML) ORAL ONCE
Refills: 0 | Status: COMPLETED | OUTPATIENT
Start: 2025-07-01 | End: 2025-07-01

## 2025-07-01 RX ORDER — SODIUM CHLORIDE 9 G/1000ML
1000 INJECTION, SOLUTION INTRAVENOUS
Refills: 0 | Status: DISCONTINUED | OUTPATIENT
Start: 2025-07-01 | End: 2025-07-02

## 2025-07-01 RX ORDER — MIDODRINE HYDROCHLORIDE 5 MG/1
10 TABLET ORAL EVERY 8 HOURS
Refills: 0 | Status: DISCONTINUED | OUTPATIENT
Start: 2025-07-01 | End: 2025-07-03

## 2025-07-01 RX ORDER — ATORVASTATIN CALCIUM 80 MG/1
40 TABLET, FILM COATED ORAL AT BEDTIME
Refills: 0 | Status: DISCONTINUED | OUTPATIENT
Start: 2025-07-01 | End: 2025-07-03

## 2025-07-01 RX ADMIN — MIDODRINE HYDROCHLORIDE 10 MILLIGRAM(S): 5 TABLET ORAL at 18:16

## 2025-07-01 RX ADMIN — Medication 2300 MILLILITER(S): at 16:19

## 2025-07-01 RX ADMIN — Medication 1000 MILLIGRAM(S): at 16:18

## 2025-07-01 RX ADMIN — Medication 2300 MILLILITER(S): at 11:43

## 2025-07-01 RX ADMIN — Medication 1000 MILLIGRAM(S): at 16:19

## 2025-07-01 RX ADMIN — Medication 200 GRAM(S): at 11:44

## 2025-07-01 RX ADMIN — Medication 3.38 GRAM(S): at 16:18

## 2025-07-01 RX ADMIN — Medication 25 GRAM(S): at 22:55

## 2025-07-01 RX ADMIN — ATORVASTATIN CALCIUM 40 MILLIGRAM(S): 80 TABLET, FILM COATED ORAL at 22:55

## 2025-07-01 RX ADMIN — MIDODRINE HYDROCHLORIDE 10 MILLIGRAM(S): 5 TABLET ORAL at 22:55

## 2025-07-01 RX ADMIN — Medication 400 MILLIGRAM(S): at 11:33

## 2025-07-01 RX ADMIN — Medication 1000 MILLILITER(S): at 16:19

## 2025-07-01 RX ADMIN — ALBUMIN (HUMAN) 125 MILLILITER(S): 12.5 INJECTION, SOLUTION INTRAVENOUS at 16:41

## 2025-07-01 RX ADMIN — SODIUM CHLORIDE 75 MILLILITER(S): 9 INJECTION, SOLUTION INTRAVENOUS at 16:39

## 2025-07-01 RX ADMIN — Medication 25 GRAM(S): at 16:57

## 2025-07-01 RX ADMIN — Medication 1000 MILLILITER(S): at 13:15

## 2025-07-01 NOTE — CONSULT NOTE ADULT - SUBJECTIVE AND OBJECTIVE BOX
Date/Time Patient Seen:  		  Referring MD:   Data Reviewed	       Patient is a 60y old  Male who presents with a chief complaint of     Subjective/HPI   60 yr old male with PMHx hereditary chronic bilateral lower extremity lymphedema, CAD s/p stents ~2023 on ASA plavix, HTN, HLD who presented to [facility] this afternoon 7/1/25 with progressive Lt lower extremity swelling and and erythema, worsening  PAST MEDICAL & SURGICAL HISTORY:  HTN (hypertension)    Lymphedema of both lower extremities    Morbid obesity    Paralyzed hemidiaphragm    HAKAN on CPAP    Prediabetes    PNA (pneumonia)    History of cholecystectomy          Medication list         MEDICATIONS  (STANDING):  piperacillin/tazobactam IVPB.. 3.375 Gram(s) IV Intermittent every 8 hours  piperacillin/tazobactam IVPB.. 3.375 Gram(s) IV Intermittent Once    MEDICATIONS  (PRN):         Vitals log        ICU Vital Signs Last 24 Hrs  T(C): 36.7 (01 Jul 2025 10:17), Max: 36.7 (01 Jul 2025 10:17)  T(F): 98 (01 Jul 2025 10:17), Max: 98 (01 Jul 2025 10:17)  HR: 91 (01 Jul 2025 13:16) (91 - 112)  BP: 95/66 (01 Jul 2025 13:16) (86/52 - 113/76)  BP(mean): --  ABP: --  ABP(mean): --  RR: 20 (01 Jul 2025 12:30) (18 - 24)  SpO2: 95% (01 Jul 2025 12:30) (84% - 95%)    O2 Parameters below as of 01 Jul 2025 12:30  Patient On (Oxygen Delivery Method): nasal cannula  O2 Flow (L/min): 5               Input and Output:  I&O's Detail      Lab Data                        15.4   14.46 )-----------( 133      ( 01 Jul 2025 11:50 )             46.1     07-01    138  |  106  |  16  ----------------------------<  96  3.5   |  24  |  1.10    Ca    9.1      01 Jul 2025 11:50    TPro  8.0  /  Alb  3.5  /  TBili  1.9[H]  /  DBili  x   /  AST  25  /  ALT  27  /  AlkPhos  96  07-01      CARDIAC MARKERS ( 01 Jul 2025 11:50 )  x     / x     / x     / x     / 1.1 ng/mL        Review of Systems	      Objective     Physical Examination        Pertinent Lab findings & Imaging      Moody:  NO   Adequate UO     I&O's Detail           Discussed with:     Cultures:	        Radiology                             Date/Time Patient Seen:  		  Referring MD:   Data Reviewed	       Patient is a 60y old  Male who presents with a chief complaint of     Subjective/HPI   60 yr old male with PMHx hereditary chronic bilateral lower extremity lymphedema, CAD s/p stents ~2023 on ASA plavix, HTN, HLD who presented to [facility] this afternoon 7/1/25 with progressive Lt lower extremity swelling and and erythema, worsening  PAST MEDICAL & SURGICAL HISTORY:  HTN (hypertension)    Lymphedema of both lower extremities    Morbid obesity    Paralyzed hemidiaphragm    HAKAN on CPAP    Prediabetes    PNA (pneumonia)    History of cholecystectomy          Medication list         MEDICATIONS  (STANDING):  piperacillin/tazobactam IVPB.. 3.375 Gram(s) IV Intermittent every 8 hours  piperacillin/tazobactam IVPB.. 3.375 Gram(s) IV Intermittent Once    MEDICATIONS  (PRN):         Vitals log        ICU Vital Signs Last 24 Hrs  T(C): 36.7 (01 Jul 2025 10:17), Max: 36.7 (01 Jul 2025 10:17)  T(F): 98 (01 Jul 2025 10:17), Max: 98 (01 Jul 2025 10:17)  HR: 91 (01 Jul 2025 13:16) (91 - 112)  BP: 95/66 (01 Jul 2025 13:16) (86/52 - 113/76)  BP(mean): --  ABP: --  ABP(mean): --  RR: 20 (01 Jul 2025 12:30) (18 - 24)  SpO2: 95% (01 Jul 2025 12:30) (84% - 95%)    O2 Parameters below as of 01 Jul 2025 12:30  Patient On (Oxygen Delivery Method): nasal cannula  O2 Flow (L/min): 5               Input and Output:  I&O's Detail      Lab Data                        15.4   14.46 )-----------( 133      ( 01 Jul 2025 11:50 )             46.1     07-01    138  |  106  |  16  ----------------------------<  96  3.5   |  24  |  1.10    Ca    9.1      01 Jul 2025 11:50    TPro  8.0  /  Alb  3.5  /  TBili  1.9[H]  /  DBili  x   /  AST  25  /  ALT  27  /  AlkPhos  96  07-01      CARDIAC MARKERS ( 01 Jul 2025 11:50 )  x     / x     / x     / x     / 1.1 ng/mL        Review of Systems	  stsi  weakness  alert  verbal  on RA      Objective     Physical Examination    heart s1s2  lung dc bs  head nc  head at  abd soft  obese  cn grossly int      Pertinent Lab findings & Imaging      Moody:  NO   Adequate UO     I&O's Detail           Discussed with:     Cultures:	        Radiology    ACC: [id number] EXAM:  XR CHEST PORTABLE URGENT 1V   ORDERED BY: GILLIAN MURRAY     PROCEDURE DATE:  07/01/2025          INTERPRETATION:  XR CHEST URGENT dated 7/1/2025 11:27 AM    CLINICAL INFORMATION: Male, 60 years old.  eval.    PRIOR STUDIES: None    FINDINGS/  IMPRESSION: The heart size, mediastinal and hilar contours are probably   within the rotated portable technique. There is mild congestion patchy   opacities noted over the left lower lung zone. No large effusions or   pneumothorax. Short interval surveillance two-view chest as clinically   feasible to confirm resolution of a possible left lower lobe pneumonia   suggested.    --- End of Report ---            SHAHIDA GUTIERREZ MD; Attending Radiologist  This document has been electronically signed. Jul 1 2025  3:43PM

## 2025-07-01 NOTE — ED ADULT TRIAGE NOTE - CHIEF COMPLAINT QUOTE
pt was wheeled into triage C/O left lower extremity swelling that was noticed this AM. +4 edema noted to left LE

## 2025-07-01 NOTE — CONSULT NOTE ADULT - ASSESSMENT
60-year-old male history of chronic lymphedema of the left leg, CAD on aspirin and Plavix, hypertension, high cholesterol presents to the ED with complaints of redness and pain to the left lower leg noticed this morning.  Cardiology consulted for abnormal EKG.     #Volume  -Pt grossly euvolemic outside of LLE swelling 2/2 suspected cellulitis  - Would hold Torsemide currently given soft BPs  -Strict I O's & daily weights  -TTE from 6/25: LVEF 76%. Trace MR. No need for repeat.    #Ischemia   #CAD  -s/p PCI & EFRAÍN x1 to 70% mLAD stenosis on 07/2023.   - No clear evidence of acute ischemia; Pt denying CP & Palpitations. He had no inducible cardiac sz this past weekend while golfing.   - Troponin negative x1  - EKG: Sinus tachy. LAD. Mild ST depressions in V3-V6  -Continue Aspirin, statin.  - Continue to monitor for signs or symptoms of ischemia     #Arrhythmia  - EKG: NSR, unchanged from prior EKG  - Monitor and replete lytes, keep K>4, Mg>2.    #HTN  - BP stable currently 93/63 in the setting of acute infection  -Hold home anti-HTN agents   - Continue to monitor hemodynamics     - Other cardiovascular workup will depend on clinical course.  - All other workup per primary team.  - Will continue to follow.        60-year-old male history of chronic lymphedema of the left leg, CAD on aspirin and Plavix, hypertension, high cholesterol presents to the ED with complaints of redness and pain to the left lower leg noticed this morning.  Cardiology consulted for abnormal EKG.     #Volume  -Pt grossly euvolemic outside of LLE swelling 2/2 suspected cellulitis  - Would hold Torsemide currently given soft BPs  -Strict I O's & daily weights  -TTE from 6/25: LVEF 76%. Trace MR. No need for repeat.    #Ischemia   #CAD  -s/p PCI & EFRAÍN x1 to 70% mLAD stenosis on 07/2023.   - No clear evidence of acute ischemia; Pt denying CP & Palpitations. He had no inducible cardiac sz this past weekend while golfing.   - Troponin negative x1  - suspect tachycardia 2/2 acute infection   - EKG: Sinus tachy. LAD. Mild ST depressions in V3-V6  -Continue Aspirin, statin.  - Continue to monitor for signs or symptoms of ischemia     #Arrhythmia  - EKG: NSR, unchanged from prior EKG  - Monitor and replete lytes, keep K>4, Mg>2.    #HTN  - BP stable currently 93/63 in the setting of acute infection  -s/p 3.3 L  - ICU eval noted  -Hold home anti-HTN agents   - Continue to monitor hemodynamics     - Other cardiovascular workup will depend on clinical course.  - All other workup per primary team.  - Will continue to follow.        60-year-old male history of chronic lymphedema of the left leg, CAD on aspirin, hypertension, high cholesterol presents to the ED with complaints of redness and pain to the left lower leg noticed this morning.  Cardiology consulted for abnormal EKG.     #Volume  -Pt grossly euvolemic outside of LLE swelling 2/2 suspected cellulitis  - Would hold Torsemide currently given soft BPs  -Strict I O's & daily weights  -TTE from 6/25: LVEF 76%. Trace MR. No need for repeat.    #Ischemia   #CAD  -s/p PCI & EFRAÍN x1 to 70% mLAD stenosis on 07/2023.   - No clear evidence of acute ischemia; Pt denying CP & Palpitations. He had no inducible cardiac sz this past weekend while golfing.   - Troponin negative x1  - suspect tachycardia 2/2 acute infection   - EKG: Sinus tachy. LAD. Mild ST depressions in V3-V6  -Continue Aspirin, statin.  - Continue to monitor for signs or symptoms of ischemia     #Arrhythmia  - EKG: NSR, unchanged from prior EKG  - Monitor and replete lytes, keep K>4, Mg>2.    #HTN  - BP stable currently 93/63 in the setting of acute infection  -s/p 3.3 L  - ICU eval noted  -Hold home anti-HTN agents   - Continue to monitor hemodynamics     - Other cardiovascular workup will depend on clinical course.  - All other workup per primary team.  - Will continue to follow.

## 2025-07-01 NOTE — ED PROVIDER NOTE - OBJECTIVE STATEMENT
60-year-old male history of chronic lymphedema of the left leg, CAD on aspirin and Plavix, hypertension, high cholesterol presents to the ED with complaints of redness and pain to the left lower leg noticed this morning.  Patient's wife marked the area when she was at mid calf this morning.  On arrival to the ED redness has traveled up to the distal thigh.  Patient denies any fever chills, nausea vomiting diarrhea, abdominal pain, chest pain, shortness of breath, headache dizziness lightheadedness, paresthesias or all other complaints.

## 2025-07-01 NOTE — H&P ADULT - NSHPADDITIONALINFOADULT_GEN_ALL_CORE
Is This A New Presentation, Or A Follow-Up?: Skin Lesions
- Ordering, reviewing, and interpreting labs, testing, and imaging.  - Independently obtaining a review of systems and performing a physical exam  - Reviewing consultant documentation/recommendations in addition to discussing plan of care with consultants.  - Counselling and educating patient and family regarding interpretation of aforementioned items and plan of care.  75 min

## 2025-07-01 NOTE — H&P ADULT - PROBLEM SELECTOR PLAN 1
Admit  Sepsis 2/2 to LLE cellulitis  Pan-culture  Continue iv Zosyn  Trend WBC  Check ESR, CRP, procalcitonin level  Check CT LLE  ID/surgery consults  Further work-up/management pending clinical course.

## 2025-07-01 NOTE — CONSULT NOTE ADULT - SUBJECTIVE AND OBJECTIVE BOX
[facility]  TRINI Craig S. Shah, Y. Patel, G. [facility]  [phone]    ANIL MCKNIGHT  60y, Male  [id number]    HPI--  HPI: Pt is  60-year-old male history of chronic lymphedema of the left leg, CAD on aspirin and Plavix, hypertension, high cholesterol presents to the ED with complaints of redness and pain to the left lower leg noticed this morning.  Patient's wife marked the area when she was at mid calf this morning.  On arrival to the ED redness has traveled up to the distal thigh.  Patient denies any fever chills, nausea vomiting diarrhea, abdominal pain, chest pain, shortness of breath, headache dizziness lightheadedness, paresthesias or all other complaint  Pt reports hitting his L heel at [employer] game a few days prior. Did not notice any open wounds      Active Medications--  piperacillin/tazobactam IVPB.. 3.375 Gram(s) IV Intermittent every 8 hours  piperacillin/tazobactam IVPB.. 3.375 Gram(s) IV Intermittent Once    Antimicrobials:   piperacillin/tazobactam IVPB.. 3.375 Gram(s) IV Intermittent every 8 hours  piperacillin/tazobactam IVPB.. 3.375 Gram(s) IV Intermittent Once    Immunologic:     ROS:  CONSTITUTIONAL: No fevers or chills. No weakness or headache. No weight changes.  EYES/ENT: No visual or hearing changes. No sore throat or throat pain .  NECK: No pain or stiffness  RESPIRATORY: No cough, wheezing, or hemoptysis. No shortness of breath  CARDIOVASCULAR: No chest pain or palpitations  GASTROINTESTINAL: No abdominal pain. No nausea or vomiting. No diarrhea or constipation.  GENITOURINARY: No dysuria, frequency or hematuria  NEUROLOGICAL: No numbness or weakness  SKIN: No itching or rashes  PSYCHIATRIC: Pleasant. Appropriate affect    Allergies: No Known Allergies    PMH -- HTN (hypertension)    Lymphedema of both lower extremities    Morbid obesity    Paralyzed hemidiaphragm    HAKAN on CPAP    Prediabetes    PNA (pneumonia)      PSH -- History of cholecystectomy      FH -- FH: Alzheimers disease (Father)      Social History --  EtOH: denies   Tobacco: denies   Drug Use: denies     Travel/Environmental/Occupational History:    Physical Exam--  Vital Signs Last 24 Hrs  T(F): 98 (01 Jul 2025 10:17), Max: 98 (01 Jul 2025 10:17)  HR: 91 (01 Jul 2025 13:16) (91 - 112)  BP: 95/66 (01 Jul 2025 13:16) (86/52 - 113/76)  RR: 20 (01 Jul 2025 12:30) (18 - 24)  SpO2: 95% (01 Jul 2025 12:30) (84% - 95%)  General: nontoxic-appearing, no acute distress  HEENT: NC/AT, EOMI  Lungs: CTA  Heart: Regular rate and rhythm.   Abdomen: Soft. Nondistended. Nontender.   Extremities: b/l LE edema, LLE erythema up to mid thigh  Skin: Warm. Dry. Good turgor. No rash.     Laboratory & Imaging Data:  CBC:                       15.4   14.46 )-----------( 133      ( 01 Jul 2025 11:50 )             46.1     CMP: 07-01    138  |  106  |  16  ----------------------------<  96  3.5   |  24  |  1.10    Ca    9.1      01 Jul 2025 11:50    TPro  8.0  /  Alb  3.5  /  TBili  1.9[H]  /  DBili  x   /  AST  25  /  ALT  27  /  AlkPhos  96  07-01    LIVER FUNCTIONS - ( 01 Jul 2025 11:50 )  Alb: 3.5 g/dL / Pro: 8.0 g/dL / ALK PHOS: 96 U/L / ALT: 27 U/L / AST: 25 U/L / GGT: x           Urinalysis Basic - ( 01 Jul 2025 11:50 )    Color: x / Appearance: x / SG: x / pH: x  Gluc: 96 mg/dL / Ketone: x  / Bili: x / Urobili: x   Blood: x / Protein: x / Nitrite: x   Leuk Esterase: x / RBC: x / WBC x   Sq Epi: x / Non Sq Epi: x / Bacteria: x        Microbiology: reviewed        Radiology: reviewed    < from: US Duplex Venous Lower Ext Complete, Bilateral (07.01.25 @ 13:17) >    ACC: [id number] EXAM:  US DPLX LWR EXT VEINS COMPL BI   ORDERED BY: GILLIAN I   MURRAY     PROCEDURE DATE:  07/01/2025          INTERPRETATION:  CLINICAL INFORMATION: Lower extremity edema    COMPARISON: None available.    TECHNIQUE: Duplex sonography of the BILATERAL LOWER extremity veins with   color and spectral Doppler, with and without compression.    FINDINGS:    RIGHT:  Normal compressibility of the RIGHT common femoral, femoral and popliteal   veins.  Doppler examination shows normal spontaneous and phasic flow.  No RIGHT calf vein thrombosis is detected.    LEFT:  Normal compressibility of the LEFT common femoral, femoral and popliteal   veins.  Doppler examination shows normal spontaneous and phasic flow.  No LEFT calf vein thrombosis is detected.    IMPRESSION:  No evidence of deep venous thrombosis in either lower extremity.            --- End of Report ---            PATRICIA BOX MD; Attending Radiologist  This document has been electronically signed. Jul 1 2025  2:06PM    < end of copied text >

## 2025-07-01 NOTE — H&P ADULT - NSHPSOURCEINFORD_GEN_ALL_CORE
Problem: Respiratory Impairment - Respiratory Therapy 253  Intervention: Respiratory support devices  Note: Intervention Status  Done      Patient

## 2025-07-01 NOTE — CONSULT NOTE ADULT - ASSESSMENT
Assessment:     60y old Male with stated hx significant for hereditary chronic bilateral lower extremity lymphedema, morbid obesity (recent planned 75 Ib wt loss), HAKAN on qhs CPAP therapy, CAD s/p stents ~2023 on ASA plavix, HTN, HLD who presented with progressive Lt lower extremity swelling and erythema, worsening over past 2 to 3 hrs from mid-calf to slightly above Lt knee concerning for cellulitis. Pt found to be hypotension initially refractory to IVF requiring albumin 5%/250 cc's      Plan:    #==LLE cellulitis  #==resolving HYPOtn  #==HAKAN hx  -obtain blood cultures  -c/w zosyn  -obtain CT of lower extremities  -CPAP therapy q hs  -hold oral antihypertensives   -midodrine 10 mg po q 8 hrs - hold for SBP >120 mmHg

## 2025-07-01 NOTE — H&P ADULT - NSICDXPASTMEDICALHX_GEN_ALL_CORE_FT
PAST MEDICAL HISTORY:  HTN (hypertension)     Lymphedema of both lower extremities     Morbid obesity     HAKAN on CPAP     Paralyzed hemidiaphragm     PNA (pneumonia)     Prediabetes      06/01/2018/not applicable

## 2025-07-01 NOTE — ED PROVIDER NOTE - PROGRESS NOTE DETAILS
Dr ram cardiology aware of EKG, st no changes from prior. dr burr d/w dr vazquez ID who will evaluate the patient VENKATA Ramírez: Patient's blood pressure is 80s to 90s systolic.  He is still receiving sepsis fluids.  ICU consulted for soft blood pressure.  Surgery also consulted. VENKATA Ramírez: They will continue to evaluate ICU PA regarding patient's blood pressure of 81/57 s/p 3300cc of fluids.  He recommends giving patient albumin and placing on standing lactated ringer fluids.  They will continue to evaluate if pt needs ICU level of care after albumin.  Patient currently asymptomatic and states he feels well although his blood pressure is low VENKATA Ramírez: Patient evaluated by ICU.  His blood pressure has improved to 90s systolic after albumin.  Patient is not a candidate for ICU, but they recommended giving 10 mg of midodrine.  Spoke with Dr. FREDRICK Angulo for telemetry admission.  Patient's erythema of the leg is improved s/p antibiotic.  He is currently asymptomatic

## 2025-07-01 NOTE — CONSULT NOTE ADULT - ASSESSMENT
60 yr old male with PMHx hereditary chronic bilateral lower extremity lymphedema, CAD s/p stents ~2023 on ASA plavix, HTN, HLD who presented to [facility] this afternoon 7/1/25 with progressive Lt lower extremity swelling and and erythema, worsening    lymphedema  HAKAN  CA  ASHD  HTN  HLD  OA  eval STSI    TTE from 6/25: LVEF 76%. Trace MR 60 yr old male with PMHx hereditary chronic bilateral lower extremity lymphedema, CAD s/p stents ~2023 on ASA plavix, HTN, HLD who presented to [facility] this afternoon 7/1/25 with progressive Lt lower extremity swelling and and erythema, worsening    lymphedema  HAKAN  CA  ASHD  HTN  HLD  OA  eval STSI    TTE from 6/25: LVEF 76%. Trace MR  CT leg reviewed  CXR noted  ID eval noted  emp ABX  skin care  elev affected extr  cx - biomarkers  pain assessment  demarcate edema and progress  BP control and HD monitoring  HAKAN - CPAP - follows with Sleep Specialist  sleep hygiene reviewed  weight management  goal sat > 88 pct

## 2025-07-01 NOTE — H&P ADULT - HISTORY OF PRESENT ILLNESS
This is a 60-year-old male history of chronic lymphedema of the left leg, CAD on aspirin and Plavix, hypertension, HAKAN on CPAP high cholesterol presents to the ED with complaints of redness and pain to the left lower leg noticed this morning.  Patient's wife marked the area when she was at mid calf this morning.  On arrival to the ED redness has traveled up to the distal thigh.  Patient denies any fever chills, nausea vomiting diarrhea, abdominal pain, chest pain, shortness of breath, headache dizziness lightheadedness, paresthesias or all other complaints. In ER, patient was found to be hypotensive.

## 2025-07-01 NOTE — CONSULT NOTE ADULT - SUBJECTIVE AND OBJECTIVE BOX
CHIEF COMPLAINT:    HPI:        IN PROGRESS        PAST MEDICAL & SURGICAL HISTORY:  HTN (hypertension)      Lymphedema of both lower extremities      Morbid obesity      Paralyzed hemidiaphragm      HAKAN on CPAP      Prediabetes      PNA (pneumonia)      History of cholecystectomy          FAMILY HISTORY:  FH: Alzheimers disease (Father)        SOCIAL HISTORY:  Smoking: [ ] Never Smoked [ ] Former Smoker (__ packs x ___ years) [ ] Current Smoker  (__ packs x ___ years)  Substance Use: [ ] Never Used [ ] Used ____  EtOH Use:  Marital Status: [ ] Single [ ]  [ ]  [ ]   Sexual History:   Occupation:  Recent Travel:  Country of Birth:  Advance Directives:    Allergies    No Known Allergies    Intolerances        HOME MEDICATIONS:    REVIEW OF SYSTEMS:            OBJECTIVE:  ICU Vital Signs Last 24 Hrs  T(C): 36.7 (01 Jul 2025 10:17), Max: 36.7 (01 Jul 2025 10:17)  T(F): 98 (01 Jul 2025 10:17), Max: 98 (01 Jul 2025 10:17)  HR: 92 (01 Jul 2025 13:07) (92 - 112)  BP: 87/42 (01 Jul 2025 13:07) (86/52 - 113/76)  BP(mean): --  ABP: --  ABP(mean): --  RR: 20 (01 Jul 2025 12:30) (18 - 24)  SpO2: 95% (01 Jul 2025 12:30) (84% - 95%)    O2 Parameters below as of 01 Jul 2025 12:30  Patient On (Oxygen Delivery Method): nasal cannula  O2 Flow (L/min): 5            CAPILLARY BLOOD GLUCOSE          PHYSICAL EXAM:        HOSPITAL MEDICATIONS:  MEDICATIONS  (STANDING):  piperacillin/tazobactam IVPB.. 3.375 Gram(s) IV Intermittent every 8 hours  piperacillin/tazobactam IVPB.. 3.375 Gram(s) IV Intermittent Once  sodium chloride 0.9% Bolus 1000 milliLiter(s) IV Bolus once    MEDICATIONS  (PRN):      LABS:                        15.4   x     )-----------( x        ( 01 Jul 2025 11:50 )             46.1     Hgb Trend: 15.4<--  07-01    138  |  106  |  16  ----------------------------<  96  3.5   |  24  |  1.10    Ca    9.1      01 Jul 2025 11:50    TPro  8.0  /  Alb  3.5  /  TBili  1.9[H]  /  DBili  x   /  AST  25  /  ALT  27  /  AlkPhos  96  07-01    Creatinine Trend: 1.10<--  PT/INR - ( 01 Jul 2025 11:50 )   PT: 12.5 sec;   INR: 1.06 ratio         PTT - ( 01 Jul 2025 11:50 )  PTT:30.3 sec  Urinalysis Basic - ( 01 Jul 2025 11:50 )    Color: x / Appearance: x / SG: x / pH: x  Gluc: 96 mg/dL / Ketone: x  / Bili: x / Urobili: x   Blood: x / Protein: x / Nitrite: x   Leuk Esterase: x / RBC: x / WBC x   Sq Epi: x / Non Sq Epi: x / Bacteria: x            MICROBIOLOGY:     RADIOLOGY:  [ ] Reviewed and interpreted by me    EKG:        Natacha ANP-BC (ext. [id number])           CHIEF COMPLAINT:    HPI:      60 yr old male with PMHx hereditary chronic bilateral lower extremity lymphedema, CAD s/p stents ~2023 on ASA plavix, HTN, HLD who presented to [facility] this afternoon 7/1/25 with progressive Lt lower extremity swelling and and erythema, worsening over       IN PROGRESS        PAST MEDICAL & SURGICAL HISTORY:  HTN (hypertension)      Lymphedema of both lower extremities      Morbid obesity      Paralyzed hemidiaphragm      HAKAN on CPAP      Prediabetes      PNA (pneumonia)      History of cholecystectomy          FAMILY HISTORY:  FH: Alzheimers disease (Father)        SOCIAL HISTORY:  Smoking: [ ] Never Smoked [ ] Former Smoker (__ packs x ___ years) [ ] Current Smoker  (__ packs x ___ years)  Substance Use: [ ] Never Used [ ] Used ____  EtOH Use:  Marital Status: [ ] Single [ ]  [ ]  [ ]   Sexual History:   Occupation:  Recent Travel:  Country of Birth:  Advance Directives:    Allergies    No Known Allergies    Intolerances        HOME MEDICATIONS:    REVIEW OF SYSTEMS:            OBJECTIVE:  ICU Vital Signs Last 24 Hrs  T(C): 36.7 (01 Jul 2025 10:17), Max: 36.7 (01 Jul 2025 10:17)  T(F): 98 (01 Jul 2025 10:17), Max: 98 (01 Jul 2025 10:17)  HR: 92 (01 Jul 2025 13:07) (92 - 112)  BP: 87/42 (01 Jul 2025 13:07) (86/52 - 113/76)  BP(mean): --  ABP: --  ABP(mean): --  RR: 20 (01 Jul 2025 12:30) (18 - 24)  SpO2: 95% (01 Jul 2025 12:30) (84% - 95%)    O2 Parameters below as of 01 Jul 2025 12:30  Patient On (Oxygen Delivery Method): nasal cannula  O2 Flow (L/min): 5            CAPILLARY BLOOD GLUCOSE          PHYSICAL EXAM:        HOSPITAL MEDICATIONS:  MEDICATIONS  (STANDING):  piperacillin/tazobactam IVPB.. 3.375 Gram(s) IV Intermittent every 8 hours  piperacillin/tazobactam IVPB.. 3.375 Gram(s) IV Intermittent Once  sodium chloride 0.9% Bolus 1000 milliLiter(s) IV Bolus once    MEDICATIONS  (PRN):      LABS:                        15.4   x     )-----------( x        ( 01 Jul 2025 11:50 )             46.1     Hgb Trend: 15.4<--  07-01    138  |  106  |  16  ----------------------------<  96  3.5   |  24  |  1.10    Ca    9.1      01 Jul 2025 11:50    TPro  8.0  /  Alb  3.5  /  TBili  1.9[H]  /  DBili  x   /  AST  25  /  ALT  27  /  AlkPhos  96  07-01    Creatinine Trend: 1.10<--  PT/INR - ( 01 Jul 2025 11:50 )   PT: 12.5 sec;   INR: 1.06 ratio         PTT - ( 01 Jul 2025 11:50 )  PTT:30.3 sec  Urinalysis Basic - ( 01 Jul 2025 11:50 )    Color: x / Appearance: x / SG: x / pH: x  Gluc: 96 mg/dL / Ketone: x  / Bili: x / Urobili: x   Blood: x / Protein: x / Nitrite: x   Leuk Esterase: x / RBC: x / WBC x   Sq Epi: x / Non Sq Epi: x / Bacteria: x            MICROBIOLOGY:     RADIOLOGY:  [ ] Reviewed and interpreted by me    EKG:        Natacha ANP-BC (ext. [id number])           CHIEF COMPLAINT:    HPI:      60 yr old male with PMHx hereditary chronic bilateral lower extremity lymphedema, CAD s/p stents ~2023 on ASA plavix, HTN, HLD who presented to [facility] this afternoon 7/1/25 with progressive Lt lower extremity swelling and and erythema, worsening over past 2 to 3 hrs      IN PROGRESS        PAST MEDICAL & SURGICAL HISTORY:  HTN (hypertension)      Lymphedema of both lower extremities      Morbid obesity      Paralyzed hemidiaphragm      HAKAN on CPAP      Prediabetes      PNA (pneumonia)      History of cholecystectomy          FAMILY HISTORY:  FH: Alzheimers disease (Father)        SOCIAL HISTORY:  Smoking: [ ] Never Smoked [ ] Former Smoker (__ packs x ___ years) [ ] Current Smoker  (__ packs x ___ years)  Substance Use: [ ] Never Used [ ] Used ____  EtOH Use:  Marital Status: [ ] Single [ ]  [ ]  [ ]   Sexual History:   Occupation:  Recent Travel:  Country of Birth:  Advance Directives:    Allergies    No Known Allergies    Intolerances        HOME MEDICATIONS:    REVIEW OF SYSTEMS:            OBJECTIVE:  ICU Vital Signs Last 24 Hrs  T(C): 36.7 (01 Jul 2025 10:17), Max: 36.7 (01 Jul 2025 10:17)  T(F): 98 (01 Jul 2025 10:17), Max: 98 (01 Jul 2025 10:17)  HR: 92 (01 Jul 2025 13:07) (92 - 112)  BP: 87/42 (01 Jul 2025 13:07) (86/52 - 113/76)  BP(mean): --  ABP: --  ABP(mean): --  RR: 20 (01 Jul 2025 12:30) (18 - 24)  SpO2: 95% (01 Jul 2025 12:30) (84% - 95%)    O2 Parameters below as of 01 Jul 2025 12:30  Patient On (Oxygen Delivery Method): nasal cannula  O2 Flow (L/min): 5            CAPILLARY BLOOD GLUCOSE          PHYSICAL EXAM:        HOSPITAL MEDICATIONS:  MEDICATIONS  (STANDING):  piperacillin/tazobactam IVPB.. 3.375 Gram(s) IV Intermittent every 8 hours  piperacillin/tazobactam IVPB.. 3.375 Gram(s) IV Intermittent Once  sodium chloride 0.9% Bolus 1000 milliLiter(s) IV Bolus once    MEDICATIONS  (PRN):      LABS:                        15.4   x     )-----------( x        ( 01 Jul 2025 11:50 )             46.1     Hgb Trend: 15.4<--  07-01    138  |  106  |  16  ----------------------------<  96  3.5   |  24  |  1.10    Ca    9.1      01 Jul 2025 11:50    TPro  8.0  /  Alb  3.5  /  TBili  1.9[H]  /  DBili  x   /  AST  25  /  ALT  27  /  AlkPhos  96  07-01    Creatinine Trend: 1.10<--  PT/INR - ( 01 Jul 2025 11:50 )   PT: 12.5 sec;   INR: 1.06 ratio         PTT - ( 01 Jul 2025 11:50 )  PTT:30.3 sec  Urinalysis Basic - ( 01 Jul 2025 11:50 )    Color: x / Appearance: x / SG: x / pH: x  Gluc: 96 mg/dL / Ketone: x  / Bili: x / Urobili: x   Blood: x / Protein: x / Nitrite: x   Leuk Esterase: x / RBC: x / WBC x   Sq Epi: x / Non Sq Epi: x / Bacteria: x            MICROBIOLOGY:     RADIOLOGY:  [ ] Reviewed and interpreted by me    EKG:        Natacha ANP-BC (ext. [id number])           CHIEF COMPLAINT:    HPI:      60 yr old male with PMHx hereditary chronic bilateral lower extremity lymphedema, CAD s/p stents ~2023 on ASA plavix, HTN, HLD who presented to [facility] this afternoon 7/1/25 with progressive Lt lower extremity swelling and erythema, worsening over past 2 to 3 hrs from mid-calf to slightly above Lt knee. Pt endorsed "slight" Lt heel/foot injury after [place] curb [place] [employer] one week prior to presentation without any sequela. Pt states able to perform usual daily and sporting activities. Pt denies any other trauma, insect sting. Denies Chest pain, SOB, palpitations, lightheadedness, dizziness.        In E.D. pt found to be HYPOtnsive with SBP 86/52 slight responsive to to IVF of 3.3 liters NS IVF, and Albumin 5%/250 cc's. Pt placed on standing fluid of LR @ 75 cc's/hr. Upon consult pt OOB to chair, denies any complaints (as above) SBP 95/55, HR 78 NSR with APC's, RR 18, SPO2 96% on 2 liters N/C.     As pt with stable Vital signs, OOB to chair, without c/o, AOx3, suggested midodrine 10 mg PO x1 now, currently without need for ICU admission. Please re consult if we can be of further assistance with this pt          PAST MEDICAL & SURGICAL HISTORY:  HTN (hypertension)      Lymphedema of both lower extremities      Morbid obesity      Paralyzed hemidiaphragm      HAKAN on CPAP      Prediabetes      PNA (pneumonia)      History of cholecystectomy          FAMILY HISTORY:  FH: Alzheimers disease (Father)        SOCIAL HISTORY:  Smoking: [ ] Never Smoked [ ] Former Smoker (__ packs x ___ years) [ ] Current Smoker  (__ packs x ___ years)  Substance Use: [ ] Never Used [ ] Used ____  EtOH Use:  Marital Status: [ ] Single [ ]  [ ]  [ ]   Sexual History:   Occupation:  Recent Travel:  Country of Birth:  Advance Directives:    Allergies    No Known Allergies    Intolerances        HOME MEDICATIONS:    REVIEW OF SYSTEMS:            OBJECTIVE:  ICU Vital Signs Last 24 Hrs  T(C): 36.7 (01 Jul 2025 10:17), Max: 36.7 (01 Jul 2025 10:17)  T(F): 98 (01 Jul 2025 10:17), Max: 98 (01 Jul 2025 10:17)  HR: 92 (01 Jul 2025 13:07) (92 - 112)  BP: 87/42 (01 Jul 2025 13:07) (86/52 - 113/76)  BP(mean): --  ABP: --  ABP(mean): --  RR: 20 (01 Jul 2025 12:30) (18 - 24)  SpO2: 95% (01 Jul 2025 12:30) (84% - 95%)    O2 Parameters below as of 01 Jul 2025 12:30  Patient On (Oxygen Delivery Method): nasal cannula  O2 Flow (L/min): 5            CAPILLARY BLOOD GLUCOSE          PHYSICAL EXAM:        HOSPITAL MEDICATIONS:  MEDICATIONS  (STANDING):  piperacillin/tazobactam IVPB.. 3.375 Gram(s) IV Intermittent every 8 hours  piperacillin/tazobactam IVPB.. 3.375 Gram(s) IV Intermittent Once  sodium chloride 0.9% Bolus 1000 milliLiter(s) IV Bolus once    MEDICATIONS  (PRN):      LABS:                        15.4   x     )-----------( x        ( 01 Jul 2025 11:50 )             46.1     Hgb Trend: 15.4<--  07-01    138  |  106  |  16  ----------------------------<  96  3.5   |  24  |  1.10    Ca    9.1      01 Jul 2025 11:50    TPro  8.0  /  Alb  3.5  /  TBili  1.9[H]  /  DBili  x   /  AST  25  /  ALT  27  /  AlkPhos  96  07-01    Creatinine Trend: 1.10<--  PT/INR - ( 01 Jul 2025 11:50 )   PT: 12.5 sec;   INR: 1.06 ratio         PTT - ( 01 Jul 2025 11:50 )  PTT:30.3 sec  Urinalysis Basic - ( 01 Jul 2025 11:50 )    Color: x / Appearance: x / SG: x / pH: x  Gluc: 96 mg/dL / Ketone: x  / Bili: x / Urobili: x   Blood: x / Protein: x / Nitrite: x   Leuk Esterase: x / RBC: x / WBC x   Sq Epi: x / Non Sq Epi: x / Bacteria: x            MICROBIOLOGY:     RADIOLOGY:  [ ] Reviewed and interpreted by me    EKG:        Natacha ANP-BC (ext. [id number])           CHIEF COMPLAINT:    HPI:      60 yr old male with PMHx hereditary chronic bilateral lower extremity lymphedema, morbid obesity (recent planned 75 Ib wt loss), HAKAN on qhs CPAP therapy, CAD s/p stents ~2023 on ASA plavix, HTN, HLD who presented to [facility] this afternoon 7/1/25 with progressive Lt lower extremity swelling and erythema, worsening over past 2 to 3 hrs from mid-calf to slightly above Lt knee. Pt endorsed "slight" Lt heel/foot injury after stepping on curb at [employer] stadium one week prior to presentation without any sequela. Pt states able to perform usual daily and sporting activities. Pt denies any other trauma, insect sting. Denies Chest pain, SOB, palpitations, lightheadedness, dizziness.        In E.D. pt found to be HYPOtnsive with SBP 86/52 slight responsive to to IVF of 3.3 liters NS IVF, and Albumin 5%/250 cc's. Pt placed on standing fluid of LR @ 75 cc's/hr. Upon consult pt OOB to chair, denies any complaints (as above) SBP 95/55, HR 78 NSR with APC's, RR 18, SPO2 96% on 2 liters N/C.     As pt with stable Vital signs, OOB to chair, without c/o, AOx3, suggested midodrine 10 mg PO x1 now, currently without need for ICU admission. Please re consult if we can be of further assistance with this pt      PAST MEDICAL & SURGICAL HISTORY:  HTN (hypertension)      Lymphedema of both lower extremities      Morbid obesity      Paralyzed hemidiaphragm      HAKAN on CPAP      Prediabetes      PNA (pneumonia)      History of cholecystectomy          FAMILY HISTORY:  FH: Alzheimers disease (Father)        SOCIAL HISTORY:  Smoking: [ ] Never Smoked [ ] Former Smoker (__ packs x ___ years) [ ] Current Smoker  (__ packs x ___ years)  Substance Use: [ ] Never Used [ ] Used ____  EtOH Use:  Marital Status: [ ] Single [ ]  [ ]  [ ]   Sexual History:   Occupation:  Recent Travel:  Country of Birth:  Advance Directives:    Allergies    No Known Allergies    Intolerances        HOME MEDICATIONS:    REVIEW OF SYSTEMS:            OBJECTIVE:  ICU Vital Signs Last 24 Hrs  T(C): 36.7 (01 Jul 2025 10:17), Max: 36.7 (01 Jul 2025 10:17)  T(F): 98 (01 Jul 2025 10:17), Max: 98 (01 Jul 2025 10:17)  HR: 92 (01 Jul 2025 13:07) (92 - 112)  BP: 87/42 (01 Jul 2025 13:07) (86/52 - 113/76)  BP(mean): --  ABP: --  ABP(mean): --  RR: 20 (01 Jul 2025 12:30) (18 - 24)  SpO2: 95% (01 Jul 2025 12:30) (84% - 95%)    O2 Parameters below as of 01 Jul 2025 12:30  Patient On (Oxygen Delivery Method): nasal cannula  O2 Flow (L/min): 5            CAPILLARY BLOOD GLUCOSE          PHYSICAL EXAM:        HOSPITAL MEDICATIONS:  MEDICATIONS  (STANDING):  piperacillin/tazobactam IVPB.. 3.375 Gram(s) IV Intermittent every 8 hours  piperacillin/tazobactam IVPB.. 3.375 Gram(s) IV Intermittent Once  sodium chloride 0.9% Bolus 1000 milliLiter(s) IV Bolus once    MEDICATIONS  (PRN):      LABS:                        15.4   x     )-----------( x        ( 01 Jul 2025 11:50 )             46.1     Hgb Trend: 15.4<--  07-01    138  |  106  |  16  ----------------------------<  96  3.5   |  24  |  1.10    Ca    9.1      01 Jul 2025 11:50    TPro  8.0  /  Alb  3.5  /  TBili  1.9[H]  /  DBili  x   /  AST  25  /  ALT  27  /  AlkPhos  96  07-01    Creatinine Trend: 1.10<--  PT/INR - ( 01 Jul 2025 11:50 )   PT: 12.5 sec;   INR: 1.06 ratio         PTT - ( 01 Jul 2025 11:50 )  PTT:30.3 sec  Urinalysis Basic - ( 01 Jul 2025 11:50 )    Color: x / Appearance: x / SG: x / pH: x  Gluc: 96 mg/dL / Ketone: x  / Bili: x / Urobili: x   Blood: x / Protein: x / Nitrite: x   Leuk Esterase: x / RBC: x / WBC x   Sq Epi: x / Non Sq Epi: x / Bacteria: x            MICROBIOLOGY:     RADIOLOGY:  [ ] Reviewed and interpreted by me    EKG:        Natacha [facility]-BC (ext. [id number])           CHIEF COMPLAINT:    HPI:      60 yr old male with PMHx hereditary chronic bilateral lower extremity lymphedema, morbid obesity (recent planned 75 Ib wt loss), HAKAN on qhs CPAP therapy, CAD s/p stents ~2023 on ASA plavix, HTN, HLD who presented to [facility] this afternoon 7/1/25 with progressive Lt lower extremity swelling and erythema, worsening over past 2 to 3 hrs from mid-calf to slightly above Lt knee. Pt endorsed "slight" Lt heel/foot injury after [place] on curb at [employer] one week prior to presentation without any sequela. Pt states able to perform usual daily and sporting activities. Pt denies any other trauma, insect sting. Denies Chest pain, SOB, palpitations, lightheadedness, dizziness.        In E.D. pt found to be HYPOtnsive with SBP 86/52 slight responsive to to IVF of 3.3 liters NS IVF, and Albumin 5%/250 cc's. Pt placed on standing fluid of LR @ 75 cc's/hr. Upon consult pt OOB to chair, denies any complaints (as above) SBP 95/55, HR 78 NSR with APC's, RR 18, SPO2 96% on 2 liters N/C.     As pt with stable Vital signs, OOB to chair, without c/o, AOx3, suggested midodrine 10 mg PO x1 now, currently without need for ICU admission. Please re consult if we can be of further assistance with this pt      PAST MEDICAL & SURGICAL HISTORY:  HTN (hypertension)      Lymphedema of both lower extremities      Morbid obesity      Paralyzed hemidiaphragm      HAKAN on CPAP      Prediabetes      PNA (pneumonia)      History of cholecystectomy          FAMILY HISTORY:  FH: Alzheimers disease (Father)        SOCIAL HISTORY:  Smoking: [ ] Never Smoked [ ] Former Smoker (__ packs x ___ years) [ ] Current Smoker  (__ packs x ___ years)  Substance Use: [ ] Never Used [ ] Used ____  EtOH Use:  Marital Status: [ ] Single [ ]  [ ]  [ ]   Sexual History:   Occupation:  Recent Travel:  Country of Birth:  Advance Directives:    Allergies    No Known Allergies    Intolerances        HOME MEDICATIONS:    REVIEW OF SYSTEMS:  Constitutional: denies fever, chills  HEENT: denies blurry vision, difficulty hearing  Respiratory: denies SOB, [place], cough  Cardiovascular: denies CP, palpitations, orthopnea, PND, LE edema  Gastrointestinal: denies nausea, vomiting, abdominal pain  Genitourinary: denies urinary changes  Skin:  Chronic lymphedema in the bilateral lower extremities . LLE erythema and swelling   Neurologic: denies headache, weakness, dizziness  Hematology/Oncology: denies bleeding, easy bruising  ROS negative except as noted above    OBJECTIVE:  ICU Vital Signs Last 24 Hrs  T(C): 36.7 (01 Jul 2025 10:17), Max: 36.7 (01 Jul 2025 10:17)  T(F): 98 (01 Jul 2025 10:17), Max: 98 (01 Jul 2025 10:17)  HR: 92 (01 Jul 2025 13:07) (92 - 112)  BP: 87/42 (01 Jul 2025 13:07) (86/52 - 113/76)  BP(mean): --  ABP: --  ABP(mean): --  RR: 20 (01 Jul 2025 12:30) (18 - 24)  SpO2: 95% (01 Jul 2025 12:30) (84% - 95%)    O2 Parameters below as of 01 Jul 2025 12:30  Patient On (Oxygen Delivery Method): nasal cannula  O2 Flow (L/min): 5    CAPILLARY BLOOD GLUCOSE    VITAL SIGNS AT TIME OF CONSULT  see above note    PHYSICAL EXAM:  General: Well developed, well nourished, Obese NAD  HEENT: NCAT, PERRLA 3 mm, EOMI bilat, moist mucous membranes   Neck: Supple, nontender, no mass  Neurology: A&Ox3, nonfocal, sensation intact   Respiratory: diminished breath sounds in lower lung fields bases to 1/4 up, Clear throughout  CV: NSR +S1/S2, no murmurs, rubs or gallops  Abdominal: Soft, obese, non distended, non tender, bowel sounds present no palpable masses  Extremities:   Chronic lymphedema in the bilateral lower extremities . LLE erythema, warm to touch, swelling Lt>Rt  MSK: Normal ROM, no joint erythema or warmth, no joint swelling   Heme: No obvious ecchymosis or petechiae   Skin: warm, dry, normal color      HOSPITAL MEDICATIONS:  MEDICATIONS  (STANDING):  piperacillin/tazobactam IVPB.. 3.375 Gram(s) IV Intermittent every 8 hours  piperacillin/tazobactam IVPB.. 3.375 Gram(s) IV Intermittent Once  sodium chloride 0.9% Bolus 1000 milliLiter(s) IV Bolus once    MEDICATIONS  (PRN):      LABS:                        15.4   x     )-----------( x        ( 01 Jul 2025 11:50 )             46.1     Hgb Trend: 15.4<--  07-01    138  |  106  |  16  ----------------------------<  96  3.5   |  24  |  1.10    Ca    9.1      01 Jul 2025 11:50    TPro  8.0  /  Alb  3.5  /  TBili  1.9[H]  /  DBili  x   /  AST  25  /  ALT  27  /  AlkPhos  96  07-01    Creatinine Trend: 1.10<--  PT/INR - ( 01 Jul 2025 11:50 )   PT: 12.5 sec;   INR: 1.06 ratio         PTT - ( 01 Jul 2025 11:50 )  PTT:30.3 sec  Urinalysis Basic - ( 01 Jul 2025 11:50 )    Color: x / Appearance: x / SG: x / pH: x  Gluc: 96 mg/dL / Ketone: x  / Bili: x / Urobili: x   Blood: x / Protein: x / Nitrite: x   Leuk Esterase: x / RBC: x / WBC x   Sq Epi: x / Non Sq Epi: x / Bacteria: x            MICROBIOLOGY:     RADIOLOGY:  [ ] Reviewed and interpreted by me    EKG:        Natacha ANP-BC (ext. [id number])           CHIEF COMPLAINT:    HPI:      60 yr old male with PMHx hereditary chronic bilateral lower extremity lymphedema, morbid obesity (recent planned 75 Ib wt loss), HAKAN on qhs CPAP therapy, CAD s/p stents ~2023 on ASA plavix, HTN, HLD who presented to [facility] this afternoon 7/1/25 with progressive Lt lower extremity swelling and erythema, worsening over past 2 to 3 hrs from mid-calf to slightly above Lt knee, concerning for cellulitis. Pt endorsed "slight" Lt heel/foot injury after [place] curb [place] [employer] [place] one week prior to presentation without any sequela. Pt states able to perform usual daily and sporting activities. Pt denies any other trauma, insect sting. Denies Chest pain, SOB, palpitations, lightheadedness, dizziness.        In E.D. pt found to be HYPOtnsive with SBP 86/52 slight responsive to to IVF of 3.3 liters NS IVF, and Albumin 5%/250 cc's, leukocytosis of 14.46 . Pt placed on standing fluid of LR @ 75 cc's/hr. Upon consult pt OOB to chair, denies any complaints (as above) SBP 95/55, HR 78 NSR with APC's, RR 18, SPO2 96% on 2 liters N/C.     As pt with stable Vital signs, OOB to chair, without c/o, AOx3, suggested midodrine 10 mg PO x1 now, currently without need for ICU admission. Please re consult if we can be of further assistance with this pt      PAST MEDICAL & SURGICAL HISTORY:  HTN (hypertension)      Lymphedema of both lower extremities      Morbid obesity      Paralyzed hemidiaphragm      HAKAN on CPAP      Prediabetes      PNA (pneumonia)      History of cholecystectomy          FAMILY HISTORY:  FH: Alzheimers disease (Father)        SOCIAL HISTORY:  Smoking: [ ] Never Smoked [ ] Former Smoker (__ packs x ___ years) [ ] Current Smoker  (__ packs x ___ years)  Substance Use: [ ] Never Used [ ] Used ____  EtOH Use:  Marital Status: [ ] Single [ ]  [ ]  [ ]   Sexual History:   Occupation:  Recent Travel:  Country of Birth:  Advance Directives:    Allergies    No Known Allergies    Intolerances        HOME MEDICATIONS:    REVIEW OF SYSTEMS:  Constitutional: denies fever, chills  HEENT: denies blurry vision, difficulty hearing  Respiratory: denies SOB, [place], cough  Cardiovascular: denies CP, palpitations, orthopnea, PND, LE edema  Gastrointestinal: denies nausea, vomiting, abdominal pain  Genitourinary: denies urinary changes  Skin:  Chronic lymphedema in the bilateral lower extremities . LLE erythema and swelling   Neurologic: denies headache, weakness, dizziness  Hematology/Oncology: denies bleeding, easy bruising  ROS negative except as noted above    OBJECTIVE:  ICU Vital Signs Last 24 Hrs  T(C): 36.7 (01 Jul 2025 10:17), Max: 36.7 (01 Jul 2025 10:17)  T(F): 98 (01 Jul 2025 10:17), Max: 98 (01 Jul 2025 10:17)  HR: 92 (01 Jul 2025 13:07) (92 - 112)  BP: 87/42 (01 Jul 2025 13:07) (86/52 - 113/76)  BP(mean): --  ABP: --  ABP(mean): --  RR: 20 (01 Jul 2025 12:30) (18 - 24)  SpO2: 95% (01 Jul 2025 12:30) (84% - 95%)    O2 Parameters below as of 01 Jul 2025 12:30  Patient On (Oxygen Delivery Method): nasal cannula  O2 Flow (L/min): 5    CAPILLARY BLOOD GLUCOSE    VITAL SIGNS AT TIME OF CONSULT  see above note    PHYSICAL EXAM:  General: Well developed, well nourished, Obese NAD  HEENT: NCAT, PERRLA 3 mm, EOMI bilat, moist mucous membranes   Neck: Supple, nontender, no mass  Neurology: A&Ox3, nonfocal, sensation intact   Respiratory: diminished breath sounds in lower lung fields bases to 1/4 up, Clear throughout  CV: NSR +S1/S2, no murmurs, rubs or gallops  Abdominal: Soft, obese, non distended, non tender, bowel sounds present no palpable masses  Extremities:   Chronic lymphedema in the bilateral lower extremities . LLE erythema, warm to touch, swelling Lt>Rt  MSK: Normal ROM, no joint erythema or warmth, no joint swelling   Heme: No obvious ecchymosis or petechiae   Skin: warm, dry, normal color      HOSPITAL MEDICATIONS:  MEDICATIONS  (STANDING):  piperacillin/tazobactam IVPB.. 3.375 Gram(s) IV Intermittent every 8 hours  piperacillin/tazobactam IVPB.. 3.375 Gram(s) IV Intermittent Once  sodium chloride 0.9% Bolus 1000 milliLiter(s) IV Bolus once    MEDICATIONS  (PRN):      LABS:                        15.4   x     )-----------( x        ( 01 Jul 2025 11:50 )             46.1     Hgb Trend: 15.4<--  07-01    138  |  106  |  16  ----------------------------<  96  3.5   |  24  |  1.10    Ca    9.1      01 Jul 2025 11:50    TPro  8.0  /  Alb  3.5  /  TBili  1.9[H]  /  DBili  x   /  AST  25  /  ALT  27  /  AlkPhos  96  07-01    Creatinine Trend: 1.10<--  PT/INR - ( 01 Jul 2025 11:50 )   PT: 12.5 sec;   INR: 1.06 ratio         PTT - ( 01 Jul 2025 11:50 )  PTT:30.3 sec  Urinalysis Basic - ( 01 Jul 2025 11:50 )    Color: x / Appearance: x / SG: x / pH: x  Gluc: 96 mg/dL / Ketone: x  / Bili: x / Urobili: x   Blood: x / Protein: x / Nitrite: x   Leuk Esterase: x / RBC: x / WBC x   Sq Epi: x / Non Sq Epi: x / Bacteria: x            MICROBIOLOGY:     RADIOLOGY:  [ ] Reviewed and interpreted by me    EKG:        Natacha ANP-BC (ext. [id number])

## 2025-07-01 NOTE — CONSULT NOTE ADULT - ATTENDING COMMENTS
Fortunato is a 60 year old male chronic lymphedema of the left leg, CAD on aspirin and Plavix, hypertension, high cholesterol presents to the ED with complaints of redness and pain to the left lower leg noticed this morning.  Cardiology consulted for abnormal EKG.     - chronic lymphoedema with quickly worsening left leg cellulitis  - ekg with nsst abn laterally, generally unchanged from prior tracings  - is hypotensive/tachycardic, and presumably septic in the setting of his cellulitis  - trend lactate  - antibiotics   - no sign of volume overload, other than his LLE, and would hold torsemide and give IVF  - ef has been normal as of 6/2025    - no sign of ischemia and troponin is negative  - s/p pci to lad in 2023  - cont asa and statin    - Other cardiovascular workup will depend on clinical course.  - Will continue to follow.

## 2025-07-01 NOTE — ED ADULT TRIAGE NOTE - IDEAL BODY WEIGHT(KG)
Everton Pope MD PGY-6: Bilirubin 16.3, when plotted in the BiliTool with patient with no risk factors at this time phototherapy is not warranted since threshold is 20.1. Elevated bilirubin secondary to decreased PO yet, patient tolerating formula in the ED. 73 rate of rise <0.4 from Tcb to TSB Elise Perlman, MD - Attending Physician

## 2025-07-01 NOTE — H&P ADULT - PROBLEM SELECTOR PLAN 2
Will hold BP meds due to hypotension  Cardio consult   Further work-up/management pending clinical course.

## 2025-07-01 NOTE — ED PROVIDER NOTE - CLINICAL SUMMARY MEDICAL DECISION MAKING FREE TEXT BOX
60-year-old male with past medical history of left lower extremity lymphedema coronary artery disease presents today with redness and pain to the left lower extremity.  Wife states redness traveling up the legs since this morning.  Patient denies fever chills nausea vomiting diarrhea abdominal pain shortness of breath chest pain headache dizziness lightheadedness LOC neck pain back pain numbness tingling or pins-and-needles or weakness.  pcp= sohail craven  Gen: Awake, Alert, WD, WN, NAD, obese  Head:  NC/AT  Eyes:  PERRL, EOMI, Conjunctiva pink, lids normal, no scleral icterus  ENT: OP clear, moist mucus membranes  Neck: supple, nontender, trachea midline  Cardiac/CV:  S1 S2, RRR, no M/G/R  Respiratory/Pulm:  CTAB, good air movement, normal resp effort  Gastrointestinal/Abdomen:  Soft, nontender, nondistended, +BS  +femoral pulses  Back:  no CVAT, no MLT  Ext:  warm, well perfused, moving all extremities spontaneously, no cyanosis, Left leg +lymphedema with erythema lower leg and slightly superior to knee. area outlined with ink to garima progression,, distal pulses intact  Skin: intact, +erythema left leg, no rash, no vesicles, no petechiae, no ecchymosis  Neuro:  AAOx3, sensation intact, motor 5/5 x 4 extremities, speech clear   IVFs, IV antibiotics, labs, blood cultures, lactate, CXR, EKG, Duplex LEs,IV Ofirmev,  cellulitis, DVT 60-year-old male with past medical history of left lower extremity lymphedema coronary artery disease presents today with redness and pain to the left lower extremity.  Pt st had chills this morning. Wife states redness traveling up the legs since this morning.  Patient denies fever nausea vomiting diarrhea abdominal pain shortness of breath chest pain headache dizziness lightheadedness LOC neck pain back pain numbness tingling or pins-and-needles or weakness.  pcp= sohail craven  Gen: Awake, Alert, WD, WN, NAD, obese  Head:  NC/AT  Eyes:  PERRL, EOMI, Conjunctiva pink, lids normal, no scleral icterus  ENT: OP clear, moist mucus membranes  Neck: supple, nontender, trachea midline  Cardiac/CV:  S1 S2, RRR, no M/G/R  Respiratory/Pulm:  CTAB, good air movement, normal resp effort  Gastrointestinal/Abdomen:  Soft, nontender, nondistended, +BS  +femoral pulses  Back:  no CVAT, no MLT  Ext:  warm, well perfused, moving all extremities spontaneously, no cyanosis, Left leg +lymphedema with erythema lower leg and slightly superior to knee. area outlined with ink to garima progression,, distal pulses intact  Skin: intact, +erythema left leg, no rash, no vesicles, no petechiae, no ecchymosis  Neuro:  AAOx3, sensation intact, motor 5/5 x 4 extremities, speech clear   IVFs, IV antibiotics, labs, blood cultures, lactate, CXR, EKG, Duplex LEs,IV Ofirmev,  cellulitis, DVT

## 2025-07-01 NOTE — ED PROVIDER NOTE - ATTENDING APP SHARED VISIT CONTRIBUTION OF CARE
60-year-old male with past medical history of left lower extremity lymphedema coronary artery disease presents today with redness and pain to the left lower extremity.  Wife states redness traveling up the legs since this morning.  Patient denies fever chills nausea vomiting diarrhea abdominal pain shortness of breath chest pain headache dizziness lightheadedness LOC neck pain back pain numbness tingling or pins-and-needles or weakness.  pcp= sohail craven  Gen: Awake, Alert, WD, WN, NAD, obese  Head:  NC/AT  Eyes:  PERRL, EOMI, Conjunctiva pink, lids normal, no scleral icterus  ENT: OP clear, moist mucus membranes  Neck: supple, nontender, trachea midline  Cardiac/CV:  S1 S2, RRR, no M/G/R  Respiratory/Pulm:  CTAB, good air movement, normal resp effort  Gastrointestinal/Abdomen:  Soft, nontender, nondistended, +BS  +femoral pulses  Back:  no CVAT, no MLT  Ext:  warm, well perfused, moving all extremities spontaneously, no cyanosis, Left leg +lymphedema with erythema lower leg and slightly superior to knee. area outlined with ink to garima progression,, distal pulses intact  Skin: intact, +erythema left leg, no rash, no vesicles, no petechiae, no ecchymosis  Neuro:  AAOx3, sensation intact, motor 5/5 x 4 extremities, speech clear   IVFs, IV antibiotics, labs, blood cultures, lactate, CXR, EKG, Duplex LEs, cellulitis, DVT 60-year-old male with past medical history of left lower extremity lymphedema coronary artery disease presents today with redness and pain to the left lower extremity.  Pt st had chills this morning. Wife states redness traveling up the legs since this morning.  Patient denies fever nausea vomiting diarrhea abdominal pain shortness of breath chest pain headache dizziness lightheadedness LOC neck pain back pain numbness tingling or pins-and-needles or weakness.  pcp= sohail craven  Gen: Awake, Alert, WD, WN, NAD, obese  Head:  NC/AT  Eyes:  PERRL, EOMI, Conjunctiva pink, lids normal, no scleral icterus  ENT: OP clear, moist mucus membranes  Neck: supple, nontender, trachea midline  Cardiac/CV:  S1 S2, RRR, no M/G/R  Respiratory/Pulm:  CTAB, good air movement, normal resp effort  Gastrointestinal/Abdomen:  Soft, nontender, nondistended, +BS  +femoral pulses  Back:  no CVAT, no MLT  Ext:  warm, well perfused, moving all extremities spontaneously, no cyanosis, Left leg +lymphedema with erythema lower leg and slightly superior to knee. area outlined with ink to garima progression,, distal pulses intact  Skin: intact, +erythema left leg, no rash, no vesicles, no petechiae, no ecchymosis  Neuro:  AAOx3, sensation intact, motor 5/5 x 4 extremities, speech clear   IVFs, IV antibiotics, labs, blood cultures, lactate, CXR, EKG, Duplex LEs, cellulitis, DVT

## 2025-07-01 NOTE — H&P ADULT - IN ACCORDANCE WITH [place] STATE LAW, WE OFFER EVERY PATIENT A HEPATITIS C TEST. WOULD YOU LIKE TO BE TESTED TODAY?
Barry Gustafson MD    Anesthesia:   GETA    Opt out

## 2025-07-01 NOTE — CONSULT NOTE ADULT - ASSESSMENT
Pt is  60-year-old male history of chronic lymphedema of the left leg, CAD on aspirin and Plavix, hypertension, high cholesterol presents to the ED with complaints of redness and pain to the left lower leg noticed this morning.  Patient's wife marked the area when she was at mid calf this morning.  On arrival to the ED redness has traveled up to the distal thigh. Pt reports hitting his L heel at [employer] game a few days prior. Did not notice any open wounds.     Sepsis 2/2 LLE cellulitis  Hypotension on arrival, leukocytosis to 14  DVT study negative    Recommendations:   C/w zosyn  F/u pending BCx  Trend temps/WBC  Serial LE exams to monitor spread; would do CT imaging if not improving w/ Abx  Leg elevation  Further recs to follow pending above    D/w ED  D/w Dr. Angulo  Patient evaluated with face-to-face time in addition to reviewing history, labs, microbiology, and imaging.   Antibiotic stewardship, local antibiogram, infection control strategies and potential transmission issues taken into consideration at time of treatment decision making process.   Thank you for allowing us to participate in the care of your patient.  Infectious Diseases will follow. Please call with any questions.  Bernie Tan M.D.  Available on [employer] TEAMS -- *PREFERRED*  [facility] [phone]  For after 5 P.M. and weekends, please call [phone]

## 2025-07-01 NOTE — ED PROVIDER NOTE - WR ORDER DATE AND TIME 3
08/12/22 1144   Events/Summary/Plan   Events/Summary/Plan o2 spot check,RT consult   Vital Signs   Pulse 77   Respiration 12   Pulse Oximetry 97 %   $ Pulse Oximetry (Spot Check) Yes   Respiratory Assessment   Level of Consciousness Alert   Chest Exam   Work Of Breathing / Effort Within Normal Limits   Oxygen   O2 Delivery Device None - Room Air     
   08/12/22 1145   Patient History   Pulmonary Diagnosis none   Procedures Relevant to Respiratory Status stroke   Home O2 No   Nocturnal CPAP No   Home Treatments/Frequency No   Sleep Apnea Screening   Have you had a sleep study? No   Have you been diagnosed with sleep apnea? No     
01-Jul-2025 13:12

## 2025-07-01 NOTE — H&P ADULT - CARDIOVASCULAR
negative normal/regular rate and rhythm/S1 S2 present/no gallops/no rub/no murmur/peripheral edema/pedal edema

## 2025-07-01 NOTE — ED PROVIDER NOTE - PHYSICAL EXAMINATION
Gen: Well appearing in NAD.   Head: atraumatic  Heart: s1/s2, RRR  Lung: CTA b/l, no wheezing/rhonchi or rales.   Abd: soft, NT/ND, NO RLQ TTP no rebound or guarding  Msk: + Lymphedema of left lower leg noted, with erythema of the lower leg extending to distal left thigh.  +warmth and tenderness on exam.  No drainage noted.  2+ pedal pulses.  No neurovascular compromise on exam.  Negative foot drop.  Neuro: AAO x3, patient moving all extremity equally, no focal neuro deficits noted  Skin: see above   Psych: Calm and cooperative Gen: Well appearing in NAD.   Head: atraumatic  Heart: s1/s2, RRR  Lung: CTA b/l, no wheezing/rhonchi or rales.   Abd: soft, NT/ND, NO RLQ TTP no rebound or guarding  Msk: + Lymphedema of left lower leg noted, with erythema of the lower leg extending to distal left thigh.  +warmth and tenderness on exam.  No drainage noted.  2+ pedal pulses.  No neurovascular compromise on exam.  Negative foot drop. no crepitus. compartments soft.   Area of erythema marked   Neuro: AAO x3, patient moving all extremity equally, no focal neuro deficits noted  Skin: see above   Psych: Calm and cooperative

## 2025-07-01 NOTE — ED ADULT NURSE REASSESSMENT NOTE - NS ED NURSE REASSESS COMMENT FT1
Pt received by RN [facility]. Introduced self to pt and updated pt on plan of care. Pt verbalized understanding. Pt awaiting for bed at this time. Denies weakness, dizziness, HA, CP, SOB. Pt appears lethargic but arousable to voice. Bed at lowest height, call bell within reach, AOx4, denies pain or discomfort at this time.

## 2025-07-02 LAB
ALBUMIN SERPL ELPH-MCNC: 2.9 G/DL — LOW (ref 3.3–5)
ALP SERPL-CCNC: 70 U/L — SIGNIFICANT CHANGE UP (ref 40–120)
ALT FLD-CCNC: 19 U/L — SIGNIFICANT CHANGE UP (ref 12–78)
ANION GAP SERPL CALC-SCNC: 7 MMOL/L — SIGNIFICANT CHANGE UP (ref 5–17)
AST SERPL-CCNC: 12 U/L — LOW (ref 15–37)
BASOPHILS # BLD AUTO: 0.03 K/UL — SIGNIFICANT CHANGE UP (ref 0–0.2)
BASOPHILS NFR BLD AUTO: 0.2 % — SIGNIFICANT CHANGE UP (ref 0–2)
BILIRUB SERPL-MCNC: 2.3 MG/DL — HIGH (ref 0.2–1.2)
BUN SERPL-MCNC: 15 MG/DL — SIGNIFICANT CHANGE UP (ref 7–23)
CALCIUM SERPL-MCNC: 8.7 MG/DL — SIGNIFICANT CHANGE UP (ref 8.5–10.1)
CHLORIDE SERPL-SCNC: 111 MMOL/L — HIGH (ref 96–108)
CO2 SERPL-SCNC: 25 MMOL/L — SIGNIFICANT CHANGE UP (ref 22–31)
CREAT SERPL-MCNC: 1.1 MG/DL — SIGNIFICANT CHANGE UP (ref 0.5–1.3)
CRP SERPL-MCNC: 134 MG/L — HIGH
EGFR: 77 ML/MIN/1.73M2 — SIGNIFICANT CHANGE UP
EGFR: 77 ML/MIN/1.73M2 — SIGNIFICANT CHANGE UP
EOSINOPHIL # BLD AUTO: 0.04 K/UL — SIGNIFICANT CHANGE UP (ref 0–0.5)
EOSINOPHIL NFR BLD AUTO: 0.2 % — SIGNIFICANT CHANGE UP (ref 0–6)
ERYTHROCYTE [SEDIMENTATION RATE] IN BLOOD: 17 MM/HR — HIGH (ref 0–15)
GLUCOSE SERPL-MCNC: 89 MG/DL — SIGNIFICANT CHANGE UP (ref 70–99)
HCT VFR BLD CALC: 41.8 % — SIGNIFICANT CHANGE UP (ref 39–50)
HGB BLD-MCNC: 13.7 G/DL — SIGNIFICANT CHANGE UP (ref 13–17)
IMM GRANULOCYTES # BLD AUTO: 0.12 K/UL — HIGH (ref 0–0.07)
IMM GRANULOCYTES NFR BLD AUTO: 0.6 % — SIGNIFICANT CHANGE UP (ref 0–0.9)
LYMPHOCYTES # BLD AUTO: 0.44 K/UL — LOW (ref 1–3.3)
LYMPHOCYTES NFR BLD AUTO: 2.4 % — LOW (ref 13–44)
MAGNESIUM SERPL-MCNC: 1.9 MG/DL — SIGNIFICANT CHANGE UP (ref 1.6–2.6)
MCHC RBC-ENTMCNC: 29 PG — SIGNIFICANT CHANGE UP (ref 27–34)
MCHC RBC-ENTMCNC: 32.8 G/DL — SIGNIFICANT CHANGE UP (ref 32–36)
MCV RBC AUTO: 88.4 FL — SIGNIFICANT CHANGE UP (ref 80–100)
MONOCYTES # BLD AUTO: 0.86 K/UL — SIGNIFICANT CHANGE UP (ref 0–0.9)
MONOCYTES NFR BLD AUTO: 4.6 % — SIGNIFICANT CHANGE UP (ref 2–14)
NEUTROPHILS # BLD AUTO: 17.04 K/UL — HIGH (ref 1.8–7.4)
NEUTROPHILS NFR BLD AUTO: 92 % — HIGH (ref 43–77)
NRBC # BLD AUTO: 0 K/UL — SIGNIFICANT CHANGE UP (ref 0–0)
NRBC # FLD: 0 K/UL — SIGNIFICANT CHANGE UP (ref 0–0)
NRBC BLD AUTO-RTO: 0 /100 WBCS — SIGNIFICANT CHANGE UP (ref 0–0)
PLATELET # BLD AUTO: 115 K/UL — LOW (ref 150–400)
PMV BLD: 13.7 FL — HIGH (ref 7–13)
POTASSIUM SERPL-MCNC: 3.5 MMOL/L — SIGNIFICANT CHANGE UP (ref 3.5–5.3)
POTASSIUM SERPL-SCNC: 3.5 MMOL/L — SIGNIFICANT CHANGE UP (ref 3.5–5.3)
PROCALCITONIN SERPL-MCNC: 1.98 NG/ML — HIGH
PROT SERPL-MCNC: 6.3 G/DL — SIGNIFICANT CHANGE UP (ref 6–8.3)
RBC # BLD: 4.73 M/UL — SIGNIFICANT CHANGE UP (ref 4.2–5.8)
RBC # FLD: 13.4 % — SIGNIFICANT CHANGE UP (ref 10.3–14.5)
SODIUM SERPL-SCNC: 143 MMOL/L — SIGNIFICANT CHANGE UP (ref 135–145)
WBC # BLD: 18.53 K/UL — HIGH (ref 3.8–10.5)
WBC # FLD AUTO: 18.53 K/UL — HIGH (ref 3.8–10.5)

## 2025-07-02 PROCEDURE — 73701 CT LOWER EXTREMITY W/DYE: CPT

## 2025-07-02 PROCEDURE — 85652 RBC SED RATE AUTOMATED: CPT

## 2025-07-02 PROCEDURE — 71045 X-RAY EXAM CHEST 1 VIEW: CPT

## 2025-07-02 PROCEDURE — 73552 X-RAY EXAM OF FEMUR 2/>: CPT

## 2025-07-02 PROCEDURE — 84145 PROCALCITONIN (PCT): CPT

## 2025-07-02 PROCEDURE — 83605 ASSAY OF LACTIC ACID: CPT

## 2025-07-02 PROCEDURE — P9045: CPT

## 2025-07-02 PROCEDURE — 84484 ASSAY OF TROPONIN QUANT: CPT

## 2025-07-02 PROCEDURE — 80053 COMPREHEN METABOLIC PANEL: CPT

## 2025-07-02 PROCEDURE — 87040 BLOOD CULTURE FOR BACTERIA: CPT

## 2025-07-02 PROCEDURE — 83735 ASSAY OF MAGNESIUM: CPT

## 2025-07-02 PROCEDURE — 85025 COMPLETE CBC W/AUTO DIFF WBC: CPT

## 2025-07-02 PROCEDURE — 82553 CREATINE MB FRACTION: CPT

## 2025-07-02 PROCEDURE — 73590 X-RAY EXAM OF LOWER LEG: CPT

## 2025-07-02 PROCEDURE — 86140 C-REACTIVE PROTEIN: CPT

## 2025-07-02 PROCEDURE — 99233 SBSQ HOSP IP/OBS HIGH 50: CPT

## 2025-07-02 PROCEDURE — 93970 EXTREMITY STUDY: CPT

## 2025-07-02 PROCEDURE — 94660 CPAP INITIATION&MGMT: CPT

## 2025-07-02 PROCEDURE — 82550 ASSAY OF CK (CPK): CPT

## 2025-07-02 PROCEDURE — 93005 ELECTROCARDIOGRAM TRACING: CPT

## 2025-07-02 PROCEDURE — 36415 COLL VENOUS BLD VENIPUNCTURE: CPT

## 2025-07-02 PROCEDURE — 85610 PROTHROMBIN TIME: CPT

## 2025-07-02 PROCEDURE — 94760 N-INVAS EAR/PLS OXIMETRY 1: CPT

## 2025-07-02 PROCEDURE — 85730 THROMBOPLASTIN TIME PARTIAL: CPT

## 2025-07-02 RX ADMIN — Medication 25 GRAM(S): at 06:40

## 2025-07-02 RX ADMIN — Medication 650 MILLIGRAM(S): at 07:30

## 2025-07-02 RX ADMIN — Medication 25 GRAM(S): at 13:22

## 2025-07-02 RX ADMIN — MIDODRINE HYDROCHLORIDE 10 MILLIGRAM(S): 5 TABLET ORAL at 13:24

## 2025-07-02 RX ADMIN — MIDODRINE HYDROCHLORIDE 10 MILLIGRAM(S): 5 TABLET ORAL at 21:15

## 2025-07-02 RX ADMIN — Medication 650 MILLIGRAM(S): at 21:15

## 2025-07-02 RX ADMIN — SODIUM CHLORIDE 100 MILLILITER(S): 9 INJECTION, SOLUTION INTRAVENOUS at 11:43

## 2025-07-02 RX ADMIN — Medication 81 MILLIGRAM(S): at 13:25

## 2025-07-02 RX ADMIN — Medication 650 MILLIGRAM(S): at 06:40

## 2025-07-02 RX ADMIN — MIDODRINE HYDROCHLORIDE 10 MILLIGRAM(S): 5 TABLET ORAL at 06:40

## 2025-07-02 RX ADMIN — ATORVASTATIN CALCIUM 40 MILLIGRAM(S): 80 TABLET, FILM COATED ORAL at 21:15

## 2025-07-02 RX ADMIN — Medication 650 MILLIGRAM(S): at 22:15

## 2025-07-02 RX ADMIN — Medication 25 GRAM(S): at 21:14

## 2025-07-02 NOTE — PROGRESS NOTE ADULT - SUBJECTIVE AND OBJECTIVE BOX
Date of Service: 07-02-25 @ 12:37    Patient is a 60y old  Male who presents with a chief complaint of LLE redness/swelling (03 Jul 2025 05:28)      INTERVAL HPI/OVERNIGHT EVENTS: Patient seen and examined. NAD. No complaints.    Vitals:   T(C): 38.1 (07-02-25 @ 05:49), Max: 38.1 (07-02-25 @ 05:49)  HR: 97 (07-02-25 @ 05:49) (54 - 101)  BP: 95/60 (07-02-25 @ 05:49) (80/67 - 103/63)  RR: 19 (07-02-25 @ 05:49) (18 - 22)  SpO2: 93% (07-02-25 @ 05:49) (93% - 100%)    Laboratory Studies:  CBC:                       13.7   18.53 )-----------( 115      ( 02 Jul 2025 05:55 )             41.8     CMP: 07-02    143  |  111[H]  |  15  ----------------------------<  89  3.5   |  25  |  1.10    Ca    8.7      02 Jul 2025 05:55  Mg     1.9     07-02    TPro  6.3  /  Alb  2.9[L]  /  TBili  2.3[H]  /  DBili  x   /  AST  12[L]  /  ALT  19  /  AlkPhos  70  07-02    LIVER FUNCTIONS - ( 02 Jul 2025 05:55 )  Alb: 2.9 g/dL / Pro: 6.3 g/dL / ALK PHOS: 70 U/L / ALT: 19 U/L / AST: 12 U/L / GGT: x           Urinalysis Basic - ( 02 Jul 2025 05:55 )    Color: x / Appearance: x / SG: x / pH: x  Gluc: 89 mg/dL / Ketone: x  / Bili: x / Urobili: x   Blood: x / Protein: x / Nitrite: x   Leuk Esterase: x / RBC: x / WBC x   Sq Epi: x / Non Sq Epi: x / Bacteria: x        Microbiology: reviewed    MEDICATIONS  (STANDING):  aspirin enteric coated 81 milliGRAM(s) Oral daily  atorvastatin 40 milliGRAM(s) Oral at bedtime  midodrine 10 milliGRAM(s) Oral every 8 hours  piperacillin/tazobactam IVPB.. 3.375 Gram(s) IV Intermittent every 8 hours    MEDICATIONS  (PRN):  acetaminophen     Tablet .. 650 milliGRAM(s) Oral every 6 hours PRN Temp greater or equal to 38C (100.4F), Mild Pain (1 - 3)  aluminum hydroxide/magnesium hydroxide/simethicone Suspension 30 milliLiter(s) Oral every 4 hours PRN Dyspepsia  melatonin 3 milliGRAM(s) Oral at bedtime PRN Insomnia  ondansetron Injectable 4 milliGRAM(s) IV Push every 6 hours PRN Nausea and/or Vomiting          Radiology: reviewed    < from: CT Lower Extremity w/ IV Cont, Left (07.01.25 @ 15:24) >    ACC: [id number] EXAM:  CT LWR EXT IC LT   ORDERED BY: ADIS CARY     PROCEDURE DATE:  07/01/2025          INTERPRETATION:  CLINICAL INFORMATION: Lower leg cellulitis.    COMPARISON: Same day radiographs.    CONTRAST:  IV Contrast: Omnipaque 350  90 cc administered  10 cc discarded  .    TECHNIQUE: CT of the left lower extremity. Sagittal and coronal reformats   were provided.    FINDINGS:    : No additional findings.  There is subcutaneous edema at the level of the knee extending distally.   No fluid collection. No soft tissue gas. No deep myofascial fluid. There   is subcutaneous varicosities. No osseous abnormality. Trace knee   effusion. There is patellofemoral arthrosis.    IMPRESSION: Line subcutaneous edema of the lower extremity at the level   of the knee extending distally. No fluid collection or soft tissue gas.        --- End of Report ---            ML FERNÁNDEZ MD; Attending Radiologist  This document has been electronically signed. Jul 1 2025  3:55PM    < end of copied text >                REVIEW OF SYSTEMS:  CONSTITUTIONAL: + fever, no weight loss, or no fatigue  NECK: No pain, no stiffness  RESPIRATORY: No cough, no wheezing, no chills, no hemoptysis, No shortness of breath  CARDIOVASCULAR: No chest pain, no palpitations, no dizziness, no leg swelling  GASTROINTESTINAL: No abdominal pain. No nausea, no vomiting, no hematemesis; No diarrhea, no constipation. No melena, no hematochezia.  GENITOURINARY: No dysuria, no frequency, no hematuria, no incontinence  NEUROLOGICAL: No headaches, no loss of strength, no numbness, no tremors  SKIN: No itching, no burning  MUSCULOSKELETAL: No joint pain, no swelling; No muscle, no back, no extremity pain; LLE redness  PSYCHIATRIC: No depression, no mood swings,   HEME/LYMPH: No easy bruising, no bleeding gums  ALLERY AND IMMUNOLOGIC: No hives       Consultant(s) Notes Reviewed:  [X] YES  [ ] NO    PHYSICAL EXAM:  GENERAL: NAD  HEAD:  Atraumatic, Normocephalic  EYES: EOMI, PERRLA, conjunctiva and sclera clear  ENMT: No tonsillar erythema, exudates, or enlargement; Moist mucous membranes  NECK: Supple, No JVD  NERVOUS SYSTEM:  Awake & alert  CHEST/LUNG: Clear to auscultation bilaterally; No rales, rhonchi, wheezing,  HEART: Regular rate and rhythm  ABDOMEN: Soft, Nontender, Nondistended; Bowel sounds present  EXTREMITIES: + lymphedema, +LLE erythema about the same as yesterday, ?slight improvement    LYMPH: No lymphadenopathy noted  SKIN: No rashes      Advanced care planning discussed with patient/family [X] YES   [ ] NO    Advanced care planning discussed with patient/family. Patient's health status was discussed. All appropriate changes have been made regarding patient's end-of-life care. Advanced care planning forms reviewed/discussed/completed.  20 minutes spent.

## 2025-07-02 NOTE — PROGRESS NOTE ADULT - ASSESSMENT
60-year-old male history of chronic lymphedema of the left leg, CAD on aspirin, hypertension, high cholesterol presents to the ED with complaints of redness and pain to the left lower leg noticed this morning.  Cardiology consulted for abnormal EKG.     Edema, CAD  - Euvolemic apart from LLE swelling 2/2 suspected cellulitis  - Continue to hold Torsemide currently given soft BPs  - Abx per ID  - Recent TTE from 6/25: LVEF 76%. Trace MR. No need for repeat.    - s/p PCI & EFRAÍN x1 to 70% mLAD stenosis on 07/2023.   - No clear evidence of acute ischemia; Pt denying CP & Palpitations. He had no inducible cardiac sz this past weekend while golfing.   - Troponin negative x1  - EKG: Sinus tachy. LAD. Mild ST depressions in V3-V6  - Continue Aspirin, statin.  - No anginal complaints    - EKG: NSR, unchanged from prior EKG  - No tele events.  Maintaining NSR.  - Can d/c tele    - BP soft at systolic 90's.  s/p 3.3L IV resuscitation for sespis  - Hold home BP meds    - Monitor and replete lytes, keep K>4, Mg>2.  - Will continue to follow.    Brina Leon DNP, NP-C, AGACNP-C  Cardiology   Call TEAMS

## 2025-07-02 NOTE — PROGRESS NOTE ADULT - SUBJECTIVE AND OBJECTIVE BOX
Date/Time Patient Seen:  		  Referring MD:   Data Reviewed	       Patient is a 60y old  Male who presents with a chief complaint of LLE redness/swelling (01 Jul 2025 13:55)      Subjective/HPI     PAST MEDICAL & SURGICAL HISTORY:  HTN (hypertension)    Lymphedema of both lower extremities    Morbid obesity    Paralyzed hemidiaphragm    HAKAN on CPAP    Prediabetes    PNA (pneumonia)    History of cholecystectomy          Medication list         MEDICATIONS  (STANDING):  aspirin enteric coated 81 milliGRAM(s) Oral daily  atorvastatin 40 milliGRAM(s) Oral at bedtime  lactated ringers. 1000 milliLiter(s) (100 mL/Hr) IV Continuous <Continuous>  midodrine 10 milliGRAM(s) Oral every 8 hours  piperacillin/tazobactam IVPB.. 3.375 Gram(s) IV Intermittent every 8 hours    MEDICATIONS  (PRN):  acetaminophen     Tablet .. 650 milliGRAM(s) Oral every 6 hours PRN Temp greater or equal to 38C (100.4F), Mild Pain (1 - 3)  aluminum hydroxide/magnesium hydroxide/simethicone Suspension 30 milliLiter(s) Oral every 4 hours PRN Dyspepsia  melatonin 3 milliGRAM(s) Oral at bedtime PRN Insomnia  ondansetron Injectable 4 milliGRAM(s) IV Push every 6 hours PRN Nausea and/or Vomiting         Vitals log        ICU Vital Signs Last 24 Hrs  T(C): 36.7 (01 Jul 2025 21:44), Max: 37.1 (01 Jul 2025 20:55)  T(F): 98 (01 Jul 2025 21:44), Max: 98.7 (01 Jul 2025 20:55)  HR: 85 (02 Jul 2025 03:55) (54 - 112)  BP: 96/56 (01 Jul 2025 21:44) (80/67 - 113/76)  BP(mean): 71 (01 Jul 2025 20:55) (71 - 80)  ABP: --  ABP(mean): --  RR: 18 (01 Jul 2025 21:44) (18 - 24)  SpO2: 95% (02 Jul 2025 03:55) (84% - 100%)    O2 Parameters below as of 01 Jul 2025 21:44  Patient On (Oxygen Delivery Method): nasal cannula  O2 Flow (L/min): 2               Input and Output:  I&O's Detail      Lab Data                        15.4   14.46 )-----------( 133      ( 01 Jul 2025 11:50 )             46.1     07-01    138  |  106  |  16  ----------------------------<  96  3.5   |  24  |  1.10    Ca    9.1      01 Jul 2025 11:50    TPro  8.0  /  Alb  3.5  /  TBili  1.9[H]  /  DBili  x   /  AST  25  /  ALT  27  /  AlkPhos  96  07-01      CARDIAC MARKERS ( 01 Jul 2025 11:50 )  x     / x     / x     / x     / 1.1 ng/mL        Review of Systems	      Objective     Physical Examination    heart s1s2  lung dc bs  head nc  head at  abd soft      Pertinent Lab findings & Imaging      Heidy:  NO   Adequate UO     I&O's Detail           Discussed with:     Cultures:	        Radiology

## 2025-07-02 NOTE — CARE COORDINATION ASSESSMENT. - NSCAREPROVIDERS_GEN_ALL_CORE_FT
CARE PROVIDERS:  Accepting Physician: Timothy Angulo  Admitting: iTmothy Angulo  Attending: Timothy Angulo  Consultant: Brina Leon  Consultant: Yasir Aceves  Consultant: Dago Ortiz  Consultant: Rhonda Hernandez  Consultant: Bernie Tan  Covering Team: Dontrell Osman  Covering Team: Timothy Angulo  ED ACP: Monse Ramírez  ED Attending: Aníbal Sinclair  ED Nurse: Slade Ochoa  Emergency Medicine: Jose Luis Cunningham  Nurse: Merary Jeronimo  Nurse: Nesha Sloan  Nurse: Neda Pagan  Nurse: Du Roca  Override: Brynn Barboza  Override: Elliot Vo  Override: Du Roca  Primary Team: Tadeo Burroughs  Primary Team: Roopa Lowe  Registered Dietitian: Renee Dalton  Research: Demetrius Musa  : Halie Black  Student: Cliff Moeller

## 2025-07-02 NOTE — PATIENT PROFILE ADULT - HOW PATIENT ADDRESSED, PROFILE
Chief Complaint   Patient presents with     Recheck Medication     Medicare Visit       JOSELO Whipple at 12:40 PM on 3/16/2022.    Fortunato

## 2025-07-02 NOTE — PROGRESS NOTE ADULT - NS ATTEND AMEND GEN_ALL_CORE FT
60-year-old male history of chronic lymphedema of the left leg, CAD on aspirin, hypertension, high cholesterol presents to the ED with complaints of redness and pain to the left lower leg noticed this morning.  Cardiology consulted for abnormal EKG.     - Continue to hold Torsemide currently given soft BPs  - Recent TTE from 6/25: LVEF 76%. Trace MR. No need for repeat.  - Continue Aspirin, statin.  - No tele events.  Maintaining NSR.  - Can d/c tele  - BP soft at systolic 90's.  s/p 3.3L IV resuscitation for sespis  - Hold home BP meds

## 2025-07-02 NOTE — PATIENT PROFILE ADULT - FALL HARM RISK - UNIVERSAL INTERVENTIONS
Bed in lowest position, wheels locked, appropriate side rails in place/Call bell, personal items and telephone in reach/Instruct patient to call for assistance before getting out of bed or chair/Non-slip footwear when patient is out of bed/[place]ilo to call system/Physically safe environment - no spills, clutter or unnecessary equipment/Purposeful Proactive Rounding/Room/bathroom lighting operational, light cord in reach

## 2025-07-02 NOTE — PROGRESS NOTE ADULT - ASSESSMENT
60 yr old male with PMHx hereditary chronic bilateral lower extremity lymphedema, CAD s/p stents ~2023 on ASA plavix, HTN, HLD who presented to [facility] this afternoon 7/1/25 with progressive Lt lower extremity swelling and and erythema, worsening    lymphedema  HAKAN  CA  ASHD  HTN  HLD  OA  eval STSI    TTE from 6/25: LVEF 76%. Trace MR  CT leg reviewed  CXR noted  ID eval noted  emp ABX  skin care  elev affected extr  cx - biomarkers  pain assessment  demarcate edema and progress  BP control and HD monitoring  HAKAN - CPAP - follows with Sleep Specialist  sleep hygiene reviewed  weight management  goal sat > 88 pct

## 2025-07-02 NOTE — CARE COORDINATION ASSESSMENT. - OTHER PERTINENT DISCHARGE PLANNING INFORMATION:
CM met with the patient at the bedside, educated pt on role of CM and transition planning. Patient verbalized understanding. CM provided direct contact/resource folder and remains available. Pt resides in a house with his spouse and 2 children ages 19 & 21. House has 3 steps to enter, none inside. Denies any DME or prior home care services. Per patient he is completely independent with ADL's, ambulating and negotiating steps. PMD dr Demetrius Pugh [phone], Pharmacy [facility].

## 2025-07-02 NOTE — PROGRESS NOTE ADULT - SUBJECTIVE AND OBJECTIVE BOX
[facility]  TRINI Craig Y. Patel, S. Shah, G. [facility]  [phone]    Name: ANIL MCKNIGHT  Age: 60y  Gender: Male  MRN: [id number]    Interval History:  Patient seen and examined at bedside  No acute overnight events. Afebrile  Notes reviewed    Antibiotics:  piperacillin/tazobactam IVPB.. 3.375 Gram(s) IV Intermittent every 8 hours      Medications:  acetaminophen     Tablet .. 650 milliGRAM(s) Oral every 6 hours PRN  aluminum hydroxide/magnesium hydroxide/simethicone Suspension 30 milliLiter(s) Oral every 4 hours PRN  aspirin enteric coated 81 milliGRAM(s) Oral daily  atorvastatin 40 milliGRAM(s) Oral at bedtime  lactated ringers. 1000 milliLiter(s) IV Continuous <Continuous>  melatonin 3 milliGRAM(s) Oral at bedtime PRN  midodrine 10 milliGRAM(s) Oral every 8 hours  ondansetron Injectable 4 milliGRAM(s) IV Push every 6 hours PRN  piperacillin/tazobactam IVPB.. 3.375 Gram(s) IV Intermittent every 8 hours      Review of Systems:  A 10-point review of systems was obtained.  Review of systems otherwise negative except as previously noted.    Allergies: No Known Allergies    For details regarding the patient's past medical history, social history, family history, and other miscellaneous elements, please refer the initial infectious diseases consultation and/or the admitting history and physical examination for this admission.    Objective:  Vitals:   T(C): 38.1 (07-02-25 @ 05:49), Max: 38.1 (07-02-25 @ 05:49)  HR: 97 (07-02-25 @ 05:49) (54 - 101)  BP: 95/60 (07-02-25 @ 05:49) (80/67 - 103/63)  RR: 19 (07-02-25 @ 05:49) (18 - 22)  SpO2: 93% (07-02-25 @ 05:49) (93% - 100%)    Physical Examination:  General: no acute distress  HEENT: NC/AT, EOMI  Cardio: S1, S2 heard, RRR, no murmurs  Resp: breath sounds heard bilaterally  Abd: soft, NT, ND  Ext: b/l LE edema. LLE erythema improved  Skin: warm, dry, no visible rash      Laboratory Studies:  CBC:                       13.7   18.53 )-----------( 115      ( 02 Jul 2025 05:55 )             41.8     CMP: 07-02    143  |  111[H]  |  15  ----------------------------<  89  3.5   |  25  |  1.10    Ca    8.7      02 Jul 2025 05:55  Mg     1.9     07-02    TPro  6.3  /  Alb  2.9[L]  /  TBili  2.3[H]  /  DBili  x   /  AST  12[L]  /  ALT  19  /  AlkPhos  70  07-02    LIVER FUNCTIONS - ( 02 Jul 2025 05:55 )  Alb: 2.9 g/dL / Pro: 6.3 g/dL / ALK PHOS: 70 U/L / ALT: 19 U/L / AST: 12 U/L / GGT: x           Urinalysis Basic - ( 02 Jul 2025 05:55 )    Color: x / Appearance: x / SG: x / pH: x  Gluc: 89 mg/dL / Ketone: x  / Bili: x / Urobili: x   Blood: x / Protein: x / Nitrite: x   Leuk Esterase: x / RBC: x / WBC x   Sq Epi: x / Non Sq Epi: x / Bacteria: x        Microbiology: reviewed        Radiology: reviewed    < from: CT Lower Extremity w/ IV Cont, Left (07.01.25 @ 15:24) >    ACC: [id number] EXAM:  CT LWR EXT IC LT   ORDERED BY: ADIS CARY     PROCEDURE DATE:  07/01/2025          INTERPRETATION:  CLINICAL INFORMATION: Lower leg cellulitis.    COMPARISON: Same day radiographs.    CONTRAST:  IV Contrast: Omnipaque 350  90 cc administered  10 cc discarded  .    TECHNIQUE: CT of the left lower extremity. Sagittal and coronal reformats   were provided.    FINDINGS:    : No additional findings.  There is subcutaneous edema at the level of the knee extending distally.   No fluid collection. No soft tissue gas. No deep myofascial fluid. There   is subcutaneous varicosities. No osseous abnormality. Trace knee   effusion. There is patellofemoral arthrosis.    IMPRESSION: Line subcutaneous edema of the lower extremity at the level   of the knee extending distally. No fluid collection or soft tissue gas.        --- End of Report ---            ML FERNÁNDEZ MD; Attending Radiologist  This document has been electronically signed. Jul 1 2025  3:55PM    < end of copied text >     [place] [facility]  TRINI Craig Y. Patel, S. Shah, G. [facility]  [phone]    Name: ANIL MCKNIGHT  Age: 60y  Gender: Male  MRN: [id number]    Interval History:  Patient seen and examined at bedside  No acute overnight events. Afebrile  Notes reviewed    Antibiotics:  piperacillin/tazobactam IVPB.. 3.375 Gram(s) IV Intermittent every 8 hours      Medications:  acetaminophen     Tablet .. 650 milliGRAM(s) Oral every 6 hours PRN  aluminum hydroxide/magnesium hydroxide/simethicone Suspension 30 milliLiter(s) Oral every 4 hours PRN  aspirin enteric coated 81 milliGRAM(s) Oral daily  atorvastatin 40 milliGRAM(s) Oral at bedtime  lactated ringers. 1000 milliLiter(s) IV Continuous <Continuous>  melatonin 3 milliGRAM(s) Oral at bedtime PRN  midodrine 10 milliGRAM(s) Oral every 8 hours  ondansetron Injectable 4 milliGRAM(s) IV Push every 6 hours PRN  piperacillin/tazobactam IVPB.. 3.375 Gram(s) IV Intermittent every 8 hours      Review of Systems:  A 10-point review of systems was obtained.  Review of systems otherwise negative except as previously noted.    Allergies: No Known Allergies    For details regarding the patient's past medical history, social history, family history, and other miscellaneous elements, please refer the initial infectious diseases consultation and/or the admitting history and physical examination for this admission.    Objective:  Vitals:   T(C): 38.1 (07-02-25 @ 05:49), Max: 38.1 (07-02-25 @ 05:49)  HR: 97 (07-02-25 @ 05:49) (54 - 101)  BP: 95/60 (07-02-25 @ 05:49) (80/67 - 103/63)  RR: 19 (07-02-25 @ 05:49) (18 - 22)  SpO2: 93% (07-02-25 @ 05:49) (93% - 100%)    Physical Examination:  General: no acute distress  HEENT: NC/AT, EOMI  Cardio: RRR  Resp: no use resp acc muscles  Abd: soft, NT, ND  Ext: b/l LE edema. LLE erythema improved  Skin: warm, dry, no visible rash      Laboratory Studies:  CBC:                       13.7   18.53 )-----------( 115      ( 02 Jul 2025 05:55 )             41.8     CMP: 07-02    143  |  111[H]  |  15  ----------------------------<  89  3.5   |  25  |  1.10    Ca    8.7      02 Jul 2025 05:55  Mg     1.9     07-02    TPro  6.3  /  Alb  2.9[L]  /  TBili  2.3[H]  /  DBili  x   /  AST  12[L]  /  ALT  19  /  AlkPhos  70  07-02    LIVER FUNCTIONS - ( 02 Jul 2025 05:55 )  Alb: 2.9 g/dL / Pro: 6.3 g/dL / ALK PHOS: 70 U/L / ALT: 19 U/L / AST: 12 U/L / GGT: x           Urinalysis Basic - ( 02 Jul 2025 05:55 )    Color: x / Appearance: x / SG: x / pH: x  Gluc: 89 mg/dL / Ketone: x  / Bili: x / Urobili: x   Blood: x / Protein: x / Nitrite: x   Leuk Esterase: x / RBC: x / WBC x   Sq Epi: x / Non Sq Epi: x / Bacteria: x        Microbiology: reviewed        Radiology: reviewed    < from: CT Lower Extremity w/ IV Cont, Left (07.01.25 @ 15:24) >    ACC: [id number] EXAM:  CT LWR EXT IC LT   ORDERED BY: ADIS CARY     PROCEDURE DATE:  07/01/2025          INTERPRETATION:  CLINICAL INFORMATION: Lower leg cellulitis.    COMPARISON: Same day radiographs.    CONTRAST:  IV Contrast: Omnipaque 350  90 cc administered  10 cc discarded  .    TECHNIQUE: CT of the left lower extremity. Sagittal and coronal reformats   were provided.    FINDINGS:    : No additional findings.  There is subcutaneous edema at the level of the knee extending distally.   No fluid collection. No soft tissue gas. No deep myofascial fluid. There   is subcutaneous varicosities. No osseous abnormality. Trace knee   effusion. There is patellofemoral arthrosis.    IMPRESSION: Line subcutaneous edema of the lower extremity at the level   of the knee extending distally. No fluid collection or soft tissue gas.        --- End of Report ---            ML FERNÁNDEZ MD; Attending Radiologist  This document has been electronically signed. Jul 1 2025  3:55PM    < end of copied text >     [place] [facility]  TRINI Craig Y. Patel, S. Shah, G. [facility]  [phone]    Name: ANIL MCKNIGHT  Age: 60y  Gender: Male  MRN: [id number]    Interval History:  Patient seen and examined at bedside  febrile this .6F  wants to go home  Notes reviewed    Antibiotics:  piperacillin/tazobactam IVPB.. 3.375 Gram(s) IV Intermittent every 8 hours      Medications:  acetaminophen     Tablet .. 650 milliGRAM(s) Oral every 6 hours PRN  aluminum hydroxide/magnesium hydroxide/simethicone Suspension 30 milliLiter(s) Oral every 4 hours PRN  aspirin enteric coated 81 milliGRAM(s) Oral daily  atorvastatin 40 milliGRAM(s) Oral at bedtime  lactated ringers. 1000 milliLiter(s) IV Continuous <Continuous>  melatonin 3 milliGRAM(s) Oral at bedtime PRN  midodrine 10 milliGRAM(s) Oral every 8 hours  ondansetron Injectable 4 milliGRAM(s) IV Push every 6 hours PRN  piperacillin/tazobactam IVPB.. 3.375 Gram(s) IV Intermittent every 8 hours      Review of Systems:  A 10-point review of systems was obtained.  Review of systems otherwise negative except as previously noted.    Allergies: No Known Allergies    For details regarding the patient's past medical history, social history, family history, and other miscellaneous elements, please refer the initial infectious diseases consultation and/or the admitting history and physical examination for this admission.    Objective:  Vitals:   T(C): 38.1 (07-02-25 @ 05:49), Max: 38.1 (07-02-25 @ 05:49)  HR: 97 (07-02-25 @ 05:49) (54 - 101)  BP: 95/60 (07-02-25 @ 05:49) (80/67 - 103/63)  RR: 19 (07-02-25 @ 05:49) (18 - 22)  SpO2: 93% (07-02-25 @ 05:49) (93% - 100%)    Physical Examination:  General: no acute distress  HEENT: NC/AT, EOMI  Cardio: RRR  Resp: no use resp acc muscles  Abd: soft, NT, ND  Ext: b/l LE edema. LLE erythema improved  Skin: warm, dry, no visible rash      Laboratory Studies:  CBC:                       13.7   18.53 )-----------( 115      ( 02 Jul 2025 05:55 )             41.8     CMP: 07-02    143  |  111[H]  |  15  ----------------------------<  89  3.5   |  25  |  1.10    Ca    8.7      02 Jul 2025 05:55  Mg     1.9     07-02    TPro  6.3  /  Alb  2.9[L]  /  TBili  2.3[H]  /  DBili  x   /  AST  12[L]  /  ALT  19  /  AlkPhos  70  07-02    LIVER FUNCTIONS - ( 02 Jul 2025 05:55 )  Alb: 2.9 g/dL / Pro: 6.3 g/dL / ALK PHOS: 70 U/L / ALT: 19 U/L / AST: 12 U/L / GGT: x           Urinalysis Basic - ( 02 Jul 2025 05:55 )    Color: x / Appearance: x / SG: x / pH: x  Gluc: 89 mg/dL / Ketone: x  / Bili: x / Urobili: x   Blood: x / Protein: x / Nitrite: x   Leuk Esterase: x / RBC: x / WBC x   Sq Epi: x / Non Sq Epi: x / Bacteria: x        Microbiology: reviewed        Radiology: reviewed    < from: CT Lower Extremity w/ IV Cont, Left (07.01.25 @ 15:24) >    ACC: [id number] EXAM:  CT LWR EXT IC LT   ORDERED BY: ADIS CARY     PROCEDURE DATE:  07/01/2025          INTERPRETATION:  CLINICAL INFORMATION: Lower leg cellulitis.    COMPARISON: Same day radiographs.    CONTRAST:  IV Contrast: Omnipaque 350  90 cc administered  10 cc discarded  .    TECHNIQUE: CT of the left lower extremity. Sagittal and coronal reformats   were provided.    FINDINGS:    : No additional findings.  There is subcutaneous edema at the level of the knee extending distally.   No fluid collection. No soft tissue gas. No deep myofascial fluid. There   is subcutaneous varicosities. No osseous abnormality. Trace knee   effusion. There is patellofemoral arthrosis.    IMPRESSION: Line subcutaneous edema of the lower extremity at the level   of the knee extending distally. No fluid collection or soft tissue gas.        --- End of Report ---            ML FERNÁNDEZ MD; Attending Radiologist  This document has been electronically signed. Jul 1 2025  3:55PM    < end of copied text >

## 2025-07-02 NOTE — PROGRESS NOTE ADULT - ASSESSMENT
Pt is  60-year-old male history of chronic lymphedema of the left leg, CAD on aspirin and Plavix, hypertension, high cholesterol presents to the ED with complaints of redness and pain to the left lower leg noticed this morning.  Patient's wife marked the area when she was at mid calf this morning.  On arrival to the ED redness has traveled up to the distal thigh. Pt reports hitting his L heel at [employer] game a few days prior. Did not notice any open wounds.     Sepsis 2/2 LLE cellulitis  Hypotension on arrival, leukocytosis to 14  DVT study negative  CT showing Line subcutaneous edema of the lower extremity at the level of the knee extending distally. No fluid collection or soft tissue gas.    Recommendations:   C/w zosyn  F/u pending BCx  Trend temps/WBC  Serial LE exams, leg elevation  Further recs to follow pending above    ***THIS IS AN INCOMPLETE NOTE. CHARTING IN PROGRESS***    Dr. Martinez covering service 7/3. I will resume care 7/4.  Patient evaluated with face-to-face time in addition to reviewing history, labs, microbiology, and imaging.   Antibiotic stewardship, local antibiogram, infection control strategies and potential transmission issues taken into consideration at time of treatment decision making process.   Thank you for allowing us to participate in the care of your patient.  Infectious Diseases will follow. Please call with any questions.  Bernie Tan M.D.  Available on [employer] TEAMS -- *PREFERRED*  [facility] [phone]  For after 5 P.M. and weekends, please call [phone] Pt is  60-year-old male history of chronic lymphedema of the left leg, CAD on aspirin and Plavix, hypertension, high cholesterol presents to the ED with complaints of redness and pain to the left lower leg noticed this morning.  Patient's wife marked the area when she was at mid calf this morning.  On arrival to the ED redness has traveled up to the distal thigh. Pt reports hitting his L heel at [employer] game a few days prior. Did not notice any open wounds.     Sepsis 2/2 LLE cellulitis  Hypotension on arrival, leukocytosis to 14  DVT study negative  CT showing Line subcutaneous edema of the lower extremity at the level of the knee extending distally. No fluid collection or soft tissue gas.  7/2 uptrending WBC    Recommendations:   C/w zosyn  F/u pending BCx  Trend temps/WBC  Serial LE exams, leg elevation  Further recs to follow pending above    Dr. Martinez covering service 7/3. I will resume care 7/4.  Patient evaluated with face-to-face time in addition to reviewing history, labs, microbiology, and imaging.   Antibiotic stewardship, local antibiogram, infection control strategies and potential transmission issues taken into consideration at time of treatment decision making process.   Thank you for allowing us to participate in the care of your patient.  Infectious Diseases will follow. Please call with any questions.  Bernie Tan M.D.  Available on [employer] TEAMS -- *[facility]*  [facility] [phone]  For after 5 P.M. and weekends, please call [phone]

## 2025-07-02 NOTE — PATIENT PROFILE ADULT - FUNCTIONAL ASSESSMENT - DAILY ACTIVITY 6.
Daily Note     Today's date: 2022  Patient name: Radha Dillard  : 2020  MRN: [id number]  Referring provider: Brittanie Jones MD  Dx:   Encounter Diagnosis     ICD-10-CM    1  Lack of expected normal physiological development  R62 50            Subjective: patient was brought to therapy by his Mom  He was seen following ST/PT session with good participation until therapist needed to change rooms  Objective: patient seen for his first treatment session today  Built rapport with patient today  Crash pit- crawling over large foam blocks challenging vestibular processing to retrieve puzzle pieces  He was apprehensive to crawl over unsteady surface but following tactile assistance he was able to crawl over unsteady surface independently 4/5x  Following the 4th attempt he requested to be all done  Some drooling noted during this activity likely due to deficits in vestibular processing    -pt  Requested to go on swing  He climbed on while therapist stabilized swing  He tolerated less than 10 seconds on swing following liner movements    -challenged vestibular processing, trunk rotation, and crossing midline with play in long sitting with toys positioned to the side  He required tactile prompts to use two hands during play positioned on side and to cross midline  Assessment: Tolerated treatment well  Patient would benefit from continued OT      Plan: Continue per plan of care 
4 = No assist / stand by assistance

## 2025-07-02 NOTE — PROGRESS NOTE ADULT - SUBJECTIVE AND OBJECTIVE BOX
[facility] -- Fran Beasley, Michael Shields Savella, , Gem Cui  Office # [phone]    Follow Up:  Abnormal EKG    Subjective/Observations: Denies CP or palpitations.  Denies SOB, MONTAGUE or orthopnea.  On nocturnal CPAP.  Tolerating NC at 1L/min    REVIEW OF SYSTEMS: All other review of systems is negative unless indicated above  PAST MEDICAL & SURGICAL HISTORY:  HTN (hypertension)      Lymphedema of both lower extremities      Morbid obesity      Paralyzed hemidiaphragm      HAKAN on CPAP      Prediabetes      PNA (pneumonia)      History of cholecystectomy    MEDICATIONS  (STANDING):  aspirin enteric coated 81 milliGRAM(s) Oral daily  atorvastatin 40 milliGRAM(s) Oral at bedtime  lactated ringers. 1000 milliLiter(s) (100 mL/Hr) IV Continuous <Continuous>  midodrine 10 milliGRAM(s) Oral every 8 hours  piperacillin/tazobactam IVPB.. 3.375 Gram(s) IV Intermittent every 8 hours    MEDICATIONS  (PRN):  acetaminophen     Tablet .. 650 milliGRAM(s) Oral every 6 hours PRN Temp greater or equal to 38C (100.4F), Mild Pain (1 - 3)  aluminum hydroxide/magnesium hydroxide/simethicone Suspension 30 milliLiter(s) Oral every 4 hours PRN Dyspepsia  melatonin 3 milliGRAM(s) Oral at bedtime PRN Insomnia  ondansetron Injectable 4 milliGRAM(s) IV Push every 6 hours PRN Nausea and/or Vomiting    Allergies    No Known Allergies    Intolerances    Vital Signs Last 24 Hrs  T(C): 38.1 (02 Jul 2025 05:49), Max: 38.1 (02 Jul 2025 05:49)  T(F): 100.6 (02 Jul 2025 05:49), Max: 100.6 (02 Jul 2025 05:49)  HR: 97 (02 Jul 2025 05:49) (54 - 97)  BP: 95/60 (02 Jul 2025 05:49) (80/67 - 103/63)  BP(mean): 71 (01 Jul 2025 20:55) (71 - 80)  RR: 19 (02 Jul 2025 05:49) (18 - 22)  SpO2: 93% (02 Jul 2025 05:49) (93% - 100%)    Parameters below as of 02 Jul 2025 05:49  Patient On (Oxygen Delivery Method): BiPAP/CPAP  O2 Flow (L/min): 2    I&O's Summary      PHYSICAL EXAM:  TELE: NSR  Constitutional: NAD, awake and alert, well-developed  HEENT: Moist Mucous Membranes, Anicteric  Pulmonary: Non-labored, breath sounds are diminished bilaterally, No wheezing, rales or rhonchi  Cardiovascular: Regular, S1 and S2, No murmurs, rubs, gallops or clicks  Gastrointestinal: Bowel Sounds present, soft, nontender.   Lymph: + peripheral edema.  LLE +lymphedema +erythema  Skin: No visible rashes.  LLE ulcer with dressing dry and intact  Psych:  Mood & affect appropriate  LABS: All Labs Reviewed:                        13.7   18.53 )-----------( 115      ( 02 Jul 2025 05:55 )             41.8                         15.4   14.46 )-----------( 133      ( 01 Jul 2025 11:50 )             46.1     02 Jul 2025 05:55    143    |  111    |  15     ----------------------------<  89     3.5     |  25     |  1.10   01 Jul 2025 11:50    138    |  106    |  16     ----------------------------<  96     3.5     |  24     |  1.10     Ca    8.7        02 Jul 2025 05:55  Ca    9.1        01 Jul 2025 11:50  Mg     1.9       02 Jul 2025 05:55    TPro  6.3    /  Alb  2.9    /  TBili  2.3    /  DBili  x      /  AST  12     /  ALT  19     /  AlkPhos  70     02 Jul 2025 05:55  TPro  8.0    /  Alb  3.5    /  TBili  1.9    /  DBili  x      /  AST  25     /  ALT  27     /  AlkPhos  96     01 Jul 2025 11:50    PT/INR - ( 01 Jul 2025 11:50 )   PT: 12.5 sec;   INR: 1.06 ratio         PTT - ( 01 Jul 2025 11:50 )  PTT:30.3 sec  CARDIAC MARKERS ( 01 Jul 2025 11:50 )  x     / x     / x     / x     / 1.1 ng/mL      12 Lead ECG:   Ventricular Rate 107 BPM    Atrial Rate 107 BPM    P-R Interval 194 ms    QRS Duration 92 ms    Q-T Interval 340 ms    QTC Calculation(Bazett) 453 ms    P Axis 53 degrees    R Axis -51 degrees    T Axis 0 degrees    Diagnosis Line Sinus tachycardia  Left axis deviation  ST & T wave abnormality, consider lateral ischemia  Abnormal ECG (07-01-25 @ 11:37)

## 2025-07-02 NOTE — CARE COORDINATION ASSESSMENT. - NSPASTMEDSURGHISTORY_GEN_ALL_CORE_FT
PAST MEDICAL & SURGICAL HISTORY:  PNA (pneumonia)      Prediabetes      HAKAN on CPAP      Paralyzed hemidiaphragm      Morbid obesity      Lymphedema of both lower extremities      HTN (hypertension)      History of cholecystectomy

## 2025-07-03 ENCOUNTER — TRANSCRIPTION ENCOUNTER (OUTPATIENT)
Age: 60
End: 2025-07-03

## 2025-07-03 VITALS
RESPIRATION RATE: 18 BRPM | SYSTOLIC BLOOD PRESSURE: 102 MMHG | OXYGEN SATURATION: 95 % | DIASTOLIC BLOOD PRESSURE: 69 MMHG | HEART RATE: 70 BPM | TEMPERATURE: 98 F

## 2025-07-03 LAB
ANION GAP SERPL CALC-SCNC: 7 MMOL/L — SIGNIFICANT CHANGE UP (ref 5–17)
BASOPHILS # BLD AUTO: 0.02 K/UL — SIGNIFICANT CHANGE UP (ref 0–0.2)
BASOPHILS NFR BLD AUTO: 0.1 % — SIGNIFICANT CHANGE UP (ref 0–2)
BUN SERPL-MCNC: 15 MG/DL — SIGNIFICANT CHANGE UP (ref 7–23)
CALCIUM SERPL-MCNC: 8.8 MG/DL — SIGNIFICANT CHANGE UP (ref 8.5–10.1)
CHLORIDE SERPL-SCNC: 109 MMOL/L — HIGH (ref 96–108)
CO2 SERPL-SCNC: 25 MMOL/L — SIGNIFICANT CHANGE UP (ref 22–31)
CREAT SERPL-MCNC: 0.9 MG/DL — SIGNIFICANT CHANGE UP (ref 0.5–1.3)
E CLOAC COMP DNA BLD POS QL NAA+PROBE: SIGNIFICANT CHANGE UP
EGFR: 98 ML/MIN/1.73M2 — SIGNIFICANT CHANGE UP
EGFR: 98 ML/MIN/1.73M2 — SIGNIFICANT CHANGE UP
EOSINOPHIL # BLD AUTO: 0.03 K/UL — SIGNIFICANT CHANGE UP (ref 0–0.5)
EOSINOPHIL NFR BLD AUTO: 0.2 % — SIGNIFICANT CHANGE UP (ref 0–6)
GLUCOSE SERPL-MCNC: 127 MG/DL — HIGH (ref 70–99)
GRAM STN FLD: ABNORMAL
HCT VFR BLD CALC: 39.8 % — SIGNIFICANT CHANGE UP (ref 39–50)
HGB BLD-MCNC: 13.1 G/DL — SIGNIFICANT CHANGE UP (ref 13–17)
IMM GRANULOCYTES # BLD AUTO: 0.08 K/UL — HIGH (ref 0–0.07)
IMM GRANULOCYTES NFR BLD AUTO: 0.6 % — SIGNIFICANT CHANGE UP (ref 0–0.9)
LYMPHOCYTES # BLD AUTO: 0.49 K/UL — LOW (ref 1–3.3)
LYMPHOCYTES NFR BLD AUTO: 3.7 % — LOW (ref 13–44)
MAGNESIUM SERPL-MCNC: 2 MG/DL — SIGNIFICANT CHANGE UP (ref 1.6–2.6)
MCHC RBC-ENTMCNC: 29.3 PG — SIGNIFICANT CHANGE UP (ref 27–34)
MCHC RBC-ENTMCNC: 32.9 G/DL — SIGNIFICANT CHANGE UP (ref 32–36)
MCV RBC AUTO: 89 FL — SIGNIFICANT CHANGE UP (ref 80–100)
METHOD TYPE: SIGNIFICANT CHANGE UP
MONOCYTES # BLD AUTO: 1.1 K/UL — HIGH (ref 0–0.9)
MONOCYTES NFR BLD AUTO: 8.2 % — SIGNIFICANT CHANGE UP (ref 2–14)
NEUTROPHILS # BLD AUTO: 11.68 K/UL — HIGH (ref 1.8–7.4)
NEUTROPHILS NFR BLD AUTO: 87.2 % — HIGH (ref 43–77)
NRBC # BLD AUTO: 0 K/UL — SIGNIFICANT CHANGE UP (ref 0–0)
NRBC # FLD: 0 K/UL — SIGNIFICANT CHANGE UP (ref 0–0)
NRBC BLD AUTO-RTO: 0 /100 WBCS — SIGNIFICANT CHANGE UP (ref 0–0)
PLATELET # BLD AUTO: 111 K/UL — LOW (ref 150–400)
PMV BLD: 14 FL — HIGH (ref 7–13)
POTASSIUM SERPL-MCNC: 3.6 MMOL/L — SIGNIFICANT CHANGE UP (ref 3.5–5.3)
POTASSIUM SERPL-SCNC: 3.6 MMOL/L — SIGNIFICANT CHANGE UP (ref 3.5–5.3)
RBC # BLD: 4.47 M/UL — SIGNIFICANT CHANGE UP (ref 4.2–5.8)
RBC # FLD: 13.5 % — SIGNIFICANT CHANGE UP (ref 10.3–14.5)
SODIUM SERPL-SCNC: 141 MMOL/L — SIGNIFICANT CHANGE UP (ref 135–145)
WBC # BLD: 13.4 K/UL — HIGH (ref 3.8–10.5)
WBC # FLD AUTO: 13.4 K/UL — HIGH (ref 3.8–10.5)

## 2025-07-03 PROCEDURE — P9045: CPT

## 2025-07-03 PROCEDURE — 73590 X-RAY EXAM OF LOWER LEG: CPT

## 2025-07-03 PROCEDURE — 85610 PROTHROMBIN TIME: CPT

## 2025-07-03 PROCEDURE — 85652 RBC SED RATE AUTOMATED: CPT

## 2025-07-03 PROCEDURE — 84145 PROCALCITONIN (PCT): CPT

## 2025-07-03 PROCEDURE — 80048 BASIC METABOLIC PNL TOTAL CA: CPT

## 2025-07-03 PROCEDURE — 83735 ASSAY OF MAGNESIUM: CPT

## 2025-07-03 PROCEDURE — 85025 COMPLETE CBC W/AUTO DIFF WBC: CPT

## 2025-07-03 PROCEDURE — 87040 BLOOD CULTURE FOR BACTERIA: CPT

## 2025-07-03 PROCEDURE — 93005 ELECTROCARDIOGRAM TRACING: CPT

## 2025-07-03 PROCEDURE — 73701 CT LOWER EXTREMITY W/DYE: CPT

## 2025-07-03 PROCEDURE — 99232 SBSQ HOSP IP/OBS MODERATE 35: CPT

## 2025-07-03 PROCEDURE — 80053 COMPREHEN METABOLIC PANEL: CPT

## 2025-07-03 PROCEDURE — 36415 COLL VENOUS BLD VENIPUNCTURE: CPT

## 2025-07-03 PROCEDURE — 94660 CPAP INITIATION&MGMT: CPT

## 2025-07-03 PROCEDURE — 96368 THER/DIAG CONCURRENT INF: CPT

## 2025-07-03 PROCEDURE — 73552 X-RAY EXAM OF FEMUR 2/>: CPT

## 2025-07-03 PROCEDURE — 82553 CREATINE MB FRACTION: CPT

## 2025-07-03 PROCEDURE — 94760 N-INVAS EAR/PLS OXIMETRY 1: CPT

## 2025-07-03 PROCEDURE — 99285 EMERGENCY DEPT VISIT HI MDM: CPT

## 2025-07-03 PROCEDURE — 87077 CULTURE AEROBIC IDENTIFY: CPT

## 2025-07-03 PROCEDURE — 71045 X-RAY EXAM CHEST 1 VIEW: CPT

## 2025-07-03 PROCEDURE — 84484 ASSAY OF TROPONIN QUANT: CPT

## 2025-07-03 PROCEDURE — 93970 EXTREMITY STUDY: CPT

## 2025-07-03 PROCEDURE — 82550 ASSAY OF CK (CPK): CPT

## 2025-07-03 PROCEDURE — 85730 THROMBOPLASTIN TIME PARTIAL: CPT

## 2025-07-03 PROCEDURE — 83605 ASSAY OF LACTIC ACID: CPT

## 2025-07-03 PROCEDURE — 87186 SC STD MICRODIL/AGAR DIL: CPT

## 2025-07-03 PROCEDURE — 87150 DNA/RNA AMPLIFIED PROBE: CPT

## 2025-07-03 PROCEDURE — 86140 C-REACTIVE PROTEIN: CPT

## 2025-07-03 PROCEDURE — 96366 THER/PROPH/DIAG IV INF ADDON: CPT

## 2025-07-03 PROCEDURE — 96365 THER/PROPH/DIAG IV INF INIT: CPT

## 2025-07-03 RX ORDER — LOSARTAN POTASSIUM 100 MG/1
1 TABLET, FILM COATED ORAL
Refills: 0 | DISCHARGE

## 2025-07-03 RX ORDER — MIDODRINE HYDROCHLORIDE 5 MG/1
1 TABLET ORAL
Qty: 90 | Refills: 0
Start: 2025-07-03 | End: 2025-08-01

## 2025-07-03 RX ORDER — CEFPODOXIME PROXETIL 200 MG/1
1 TABLET, FILM COATED ORAL
Qty: 10 | Refills: 0
Start: 2025-07-03 | End: 2025-07-07

## 2025-07-03 RX ADMIN — Medication 25 GRAM(S): at 13:27

## 2025-07-03 RX ADMIN — MIDODRINE HYDROCHLORIDE 10 MILLIGRAM(S): 5 TABLET ORAL at 05:09

## 2025-07-03 RX ADMIN — MIDODRINE HYDROCHLORIDE 10 MILLIGRAM(S): 5 TABLET ORAL at 13:27

## 2025-07-03 RX ADMIN — Medication 81 MILLIGRAM(S): at 11:23

## 2025-07-03 RX ADMIN — Medication 25 GRAM(S): at 05:09

## 2025-07-03 NOTE — PROGRESS NOTE ADULT - SUBJECTIVE AND OBJECTIVE BOX
Date of Service: 07-03-25 @ 13:36    Patient is a 60y old  Male who presents with a chief complaint of LLE redness/swelling (03 Jul 2025 08:18)      INTERVAL HPI/OVERNIGHT EVENTS: Patient seen and examined. NAD. No complaints.    Vital Signs Last 24 Hrs  T(C): 36.8 (03 Jul 2025 13:17), Max: 36.9 (03 Jul 2025 04:51)  T(F): 98.3 (03 Jul 2025 13:17), Max: 98.5 (03 Jul 2025 04:51)  HR: 70 (03 Jul 2025 13:17) (65 - 85)  BP: 102/69 (03 Jul 2025 13:17) (102/69 - 114/68)  BP(mean): --  RR: 18 (03 Jul 2025 13:17) (18 - 19)  SpO2: 95% (03 Jul 2025 13:17) (91% - 95%)    Parameters below as of 03 Jul 2025 13:17  Patient On (Oxygen Delivery Method): room air        07-03    141  |  109[H]  |  15  ----------------------------<  127[H]  3.6   |  25  |  0.90    Ca    8.8      03 Jul 2025 07:17  Mg     2.0     07-03    TPro  6.3  /  Alb  2.9[L]  /  TBili  2.3[H]  /  DBili  x   /  AST  12[L]  /  ALT  19  /  AlkPhos  70  07-02                          13.1   13.40 )-----------( 111      ( 03 Jul 2025 07:17 )             39.8       CAPILLARY BLOOD GLUCOSE        Urinalysis Basic - ( 03 Jul 2025 07:17 )    Color: x / Appearance: x / SG: x / pH: x  Gluc: 127 mg/dL / Ketone: x  / Bili: x / Urobili: x   Blood: x / Protein: x / Nitrite: x   Leuk Esterase: x / RBC: x / WBC x   Sq Epi: x / Non Sq Epi: x / Bacteria: x      Culture - Blood (07.01.25 @ 11:35)   Specimen Source: Blood Blood-Peripheral  Culture Results:   No growth at 24 hours      Historical Values  Culture - Blood (07.01.25 @ 11:35)   Specimen Source: Blood Blood-Peripheral  Culture Results:   No growth at 24 hours          acetaminophen     Tablet .. 650 milliGRAM(s) Oral every 6 hours PRN  aluminum hydroxide/magnesium hydroxide/simethicone Suspension 30 milliLiter(s) Oral every 4 hours PRN  aspirin enteric coated 81 milliGRAM(s) Oral daily  atorvastatin 40 milliGRAM(s) Oral at bedtime  melatonin 3 milliGRAM(s) Oral at bedtime PRN  midodrine 10 milliGRAM(s) Oral every 8 hours  ondansetron Injectable 4 milliGRAM(s) IV Push every 6 hours PRN  piperacillin/tazobactam IVPB.. 3.375 Gram(s) IV Intermittent every 8 hours              REVIEW OF SYSTEMS:  CONSTITUTIONAL: No fever, no weight loss, or no fatigue  NECK: No pain, no stiffness  RESPIRATORY: No cough, no wheezing, no chills, no hemoptysis, No shortness of breath  CARDIOVASCULAR: No chest pain, no palpitations, no dizziness, no leg swelling  GASTROINTESTINAL: No abdominal pain. No nausea, no vomiting, no hematemesis; No diarrhea, no constipation. No melena, no hematochezia.  GENITOURINARY: No dysuria, no frequency, no hematuria, no incontinence  NEUROLOGICAL: No headaches, no loss of strength, no numbness, no tremors  SKIN: No itching, no burning  MUSCULOSKELETAL: No joint pain, no swelling; No muscle, no back, no extremity pain  PSYCHIATRIC: No depression, no mood swings,   HEME/LYMPH: No easy bruising, no bleeding gums  ALLERY AND IMMUNOLOGIC: No hives       Consultant(s) Notes Reviewed:  [X] YES  [ ] NO    PHYSICAL EXAM:  GENERAL: NAD  HEAD:  Atraumatic, Normocephalic  EYES: EOMI, PERRLA, conjunctiva and sclera clear  ENMT: No tonsillar erythema, exudates, or enlargement; Moist mucous membranes  NECK: Supple, No JVD  NERVOUS SYSTEM:  Awake & alert  CHEST/LUNG: Clear to auscultation bilaterally; No rales, rhonchi, wheezing,  HEART: Regular rate and rhythm  ABDOMEN: Soft, Nontender, Nondistended; Bowel sounds present  EXTREMITIES:  decreased erythema/swelling  LYMPH: No lymphadenopathy noted  SKIN: No rashes      Advanced care planning discussed with patient/family [X] YES   [ ] NO    Advanced care planning discussed with patient/family. Patient's health status was discussed. All appropriate changes have been made regarding patient's end-of-life care. Advanced care planning forms reviewed/discussed/completed.  20 minutes spent.

## 2025-07-03 NOTE — PROGRESS NOTE ADULT - SUBJECTIVE AND OBJECTIVE BOX
[facility]  Ted Martinez MD PhD, Zaida Puga MD, Bernie Tan MD, Christelle Rodriguez MD, Wyatt Wren MD  and providing coverage with Omkar Gaitan MD  Providing Infectious Disease Consultations at [facility], [facility], [facility], [facility]'s    Office# [phone] to schedule follow up appointments  Answering Service for urgent calls or New Consults [phone]  Cell# to text for urgent issues Ted Martinez [phone]     infectious diseases progress note:    ANIL MCKNIGHT is a 60y y. o. Male patient    Overnight and events of the last 24hrs reviewed    Allergies    No Known Allergies    Intolerances        ANTIBIOTICS/RELEVANT:  antimicrobials  piperacillin/tazobactam IVPB.. 3.375 Gram(s) IV Intermittent every 8 hours    immunologic:    OTHER:  acetaminophen     Tablet .. 650 milliGRAM(s) Oral every 6 hours PRN  aluminum hydroxide/magnesium hydroxide/simethicone Suspension 30 milliLiter(s) Oral every 4 hours PRN  aspirin enteric coated 81 milliGRAM(s) Oral daily  atorvastatin 40 milliGRAM(s) Oral at bedtime  melatonin 3 milliGRAM(s) Oral at bedtime PRN  midodrine 10 milliGRAM(s) Oral every 8 hours  ondansetron Injectable 4 milliGRAM(s) IV Push every 6 hours PRN      Objective:  Vital Signs Last 24 Hrs  T(C): 36.8 (03 Jul 2025 13:17), Max: 36.9 (03 Jul 2025 04:51)  T(F): 98.3 (03 Jul 2025 13:17), Max: 98.5 (03 Jul 2025 04:51)  HR: 70 (03 Jul 2025 13:17) (65 - 85)  BP: 102/69 (03 Jul 2025 13:17) (102/69 - 114/68)  BP(mean): --  RR: 18 (03 Jul 2025 13:17) (18 - 19)  SpO2: 95% (03 Jul 2025 13:17) (91% - 95%)    Parameters below as of 03 Jul 2025 13:17  Patient On (Oxygen Delivery Method): room air        T(C): 36.8 (07-03-25 @ 13:17), Max: 38.1 (07-02-25 @ 05:49)  T(C): 36.8 (07-03-25 @ 13:17), Max: 38.1 (07-02-25 @ 05:49)  T(C): 36.8 (07-03-25 @ 13:17), Max: 38.1 (07-02-25 @ 05:49)    PHYSICAL EXAM:  HEENT: NC atraumatic  Neck: supple  Respiratory: no accessory muscle use, breathing comfortably  Cardiovascular: distant  Gastrointestinal: normal appearing, nondistended  Extremities: no clubbing, no cyanosis, erythema, swelling LLE        LABS:                          13.1   13.40 )-----------( 111      ( 03 Jul 2025 07:17 )             39.8       WBC  13.40 07-03 @ 07:17  18.53 07-02 @ 05:55  14.46 07-01 @ 11:50      07-03    141  |  109[H]  |  15  ----------------------------<  127[H]  3.6   |  25  |  0.90    Ca    8.8      03 Jul 2025 07:17  Mg     2.0     07-03    TPro  6.3  /  Alb  2.9[L]  /  TBili  2.3[H]  /  DBili  x   /  AST  12[L]  /  ALT  19  /  AlkPhos  70  07-02      Creatinine: 0.90 mg/dL (07-03-25 @ 07:17)  Creatinine: 1.10 mg/dL (07-02-25 @ 05:55)  Creatinine: 1.10 mg/dL (07-01-25 @ 11:50)        Urinalysis Basic - ( 03 Jul 2025 07:17 )    Color: x / Appearance: x / SG: x / pH: x  Gluc: 127 mg/dL / Ketone: x  / Bili: x / Urobili: x   Blood: x / Protein: x / Nitrite: x   Leuk Esterase: x / RBC: x / WBC x   Sq Epi: x / Non Sq Epi: x / Bacteria: x            INFLAMMATORY MARKERS      MICROBIOLOGY:              RADIOLOGY & ADDITIONAL STUDIES:

## 2025-07-03 NOTE — PROGRESS NOTE ADULT - REASON FOR ADMISSION
LLE redness/swelling

## 2025-07-03 NOTE — DISCHARGE NOTE PROVIDER - NSDCMRMEDTOKEN_GEN_ALL_CORE_FT
Aspirin Enteric Coated 81 mg oral delayed release tablet: 1 tab(s) orally once a day  atorvastatin 40 mg oral tablet: 1 tab(s) orally once a day (at bedtime)  Farxiga 10 mg oral tablet: 1 tab(s) orally once a day  losartan 25 mg oral tablet: 1 tab(s) orally once a day  potassium chloride 10 mEq oral tablet, extended release: 1 tab(s) orally once a day  torsemide 20 mg oral tablet: 1 tablet orally once a day   Aspirin Enteric Coated 81 mg oral delayed release tablet: 1 tab(s) orally once a day  atorvastatin 40 mg oral tablet: 1 tab(s) orally once a day (at bedtime)  cefpodoxime 200 mg oral tablet: 1 tab(s) orally 2 times a day  Farxiga 10 mg oral tablet: 1 tab(s) orally once a day  midodrine 10 mg oral tablet: 1 tab(s) orally every 8 hours

## 2025-07-03 NOTE — DISCHARGE NOTE PROVIDER - NSDCCPCAREPLAN_GEN_ALL_CORE_FT
PRINCIPAL DISCHARGE DIAGNOSIS  Diagnosis: Left leg cellulitis  Assessment and Plan of Treatment: Finish course of antibiotics.  Follow-up with your primary care doctor within 1 week.        SECONDARY DISCHARGE DIAGNOSES  Diagnosis: Essential hypertension  Assessment and Plan of Treatment: Follow-up with cardiologist   within one week.     PRINCIPAL DISCHARGE DIAGNOSIS  Diagnosis: Left leg cellulitis  Assessment and Plan of Treatment: Finish course of antibiotics.  Follow-up with your primary care doctor within 1 week.        SECONDARY DISCHARGE DIAGNOSES  Diagnosis: Essential hypertension  Assessment and Plan of Treatment: Follow-up with cardiologist  within one week.  Don't take losartan and torsemide for now   Take midodrine  Dr. Rodriguez will instruct you how to titrate down the midodrine and restart your BP med and diuretic.

## 2025-07-03 NOTE — DISCHARGE NOTE NURSING/CASE MANAGEMENT/SOCIAL WORK - PATIENT PORTAL LINK FT
You can access the FollowMyHealth Patient Portal offered by [facility] by registering at the following website: [email or web address]. By joining [employer]’s FollowMyHealth portal, you will also be able to view your health information using other applications (apps) compatible with our system.

## 2025-07-03 NOTE — PROGRESS NOTE ADULT - ASSESSMENT
60-year-old male w chronic lymphedema of the left leg, CAD on aspirin and Plavix, hypertension, high cholesterol presents to the ED with complaints of redness and pain to the left lower leg noticed this morning.  Patient's wife marked the area when she was at mid calf this morning.  On arrival to the ED redness has traveled up to the distal thigh. Pt reports hitting his L heel at [employer] game a few days prior. Did not notice any open wounds.     Sepsis 2/2 LLE cellulitis  Hypotension on arrival, leukocytosis to 14  DVT study negative  CT showing Line subcutaneous edema of the lower extremity at the level of the knee extending distally. No fluid collection or soft tissue gas.  7/2 uptrending WBC  Zosyn started 7/1  -fine to complete course with Vantin 200 mg PO BID with last day 7/7    From an ID standpoint no further requirement for inpatient status for the management of ID issues. Fine with discharge from ID standpoint when other medical issues no longer require inpatient care and social issues allow for a safe discharge plan. To schedule an outpatient ID follow up appointment please call our office at [phone]    Thank you for consulting us and involving us in the management of this most interesting and challenging case.   In addition to reviewing history, imaging, documents, labs, microbiology, took into account antibiotic stewardship, local antibiogram and infection control strategies and potential transmission issues.    Please call us at [phone] or text me directly on my cell#[phone] with any concerns or further questions.

## 2025-07-03 NOTE — PROGRESS NOTE ADULT - SUBJECTIVE AND OBJECTIVE BOX
Date/Time Patient Seen:  		  Referring MD:   Data Reviewed	       Patient is a 60y old  Male who presents with a chief complaint of LLE redness/swelling (02 Jul 2025 13:30)      Subjective/HPI     PAST MEDICAL & SURGICAL HISTORY:  HTN (hypertension)    Lymphedema of both lower extremities    Morbid obesity    Paralyzed hemidiaphragm    HAKAN on CPAP    Prediabetes    PNA (pneumonia)    History of cholecystectomy          Medication list         MEDICATIONS  (STANDING):  aspirin enteric coated 81 milliGRAM(s) Oral daily  atorvastatin 40 milliGRAM(s) Oral at bedtime  midodrine 10 milliGRAM(s) Oral every 8 hours  piperacillin/tazobactam IVPB.. 3.375 Gram(s) IV Intermittent every 8 hours    MEDICATIONS  (PRN):  acetaminophen     Tablet .. 650 milliGRAM(s) Oral every 6 hours PRN Temp greater or equal to 38C (100.4F), Mild Pain (1 - 3)  aluminum hydroxide/magnesium hydroxide/simethicone Suspension 30 milliLiter(s) Oral every 4 hours PRN Dyspepsia  melatonin 3 milliGRAM(s) Oral at bedtime PRN Insomnia  ondansetron Injectable 4 milliGRAM(s) IV Push every 6 hours PRN Nausea and/or Vomiting         Vitals log        ICU Vital Signs Last 24 Hrs  T(C): 36.9 (03 Jul 2025 04:51), Max: 38.1 (02 Jul 2025 05:49)  T(F): 98.5 (03 Jul 2025 04:51), Max: 100.6 (02 Jul 2025 05:49)  HR: 81 (03 Jul 2025 04:51) (65 - 97)  BP: 114/68 (03 Jul 2025 04:51) (95/60 - 114/68)  BP(mean): --  ABP: --  ABP(mean): --  RR: 18 (03 Jul 2025 04:51) (18 - 19)  SpO2: 95% (03 Jul 2025 04:51) (91% - 96%)    O2 Parameters below as of 03 Jul 2025 04:51  Patient On (Oxygen Delivery Method): BiPAP/CPAP                 Input and Output:  I&O's Detail    02 Jul 2025 07:01  -  03 Jul 2025 05:28  --------------------------------------------------------  IN:    IV PiggyBack: 200 mL    Lactated Ringers: 1300 mL    Oral Fluid: 900 mL  Total IN: 2400 mL    OUT:    Voided (mL): 700 mL  Total OUT: 700 mL    Total NET: 1700 mL          Lab Data                        13.7   18.53 )-----------( 115      ( 02 Jul 2025 05:55 )             41.8     07-02    143  |  111[H]  |  15  ----------------------------<  89  3.5   |  25  |  1.10    Ca    8.7      02 Jul 2025 05:55  Mg     1.9     07-02    TPro  6.3  /  Alb  2.9[L]  /  TBili  2.3[H]  /  DBili  x   /  AST  12[L]  /  ALT  19  /  AlkPhos  70  07-02      CARDIAC MARKERS ( 01 Jul 2025 11:50 )  x     / x     / x     / x     / 1.1 ng/mL        Review of Systems	      Objective     Physical Examination    heart s1s2  lung dc bs  head nc  head at  abd soft      Pertinent Lab findings & Imaging      Heidy:  NO   Adequate UO     I&O's Detail    02 Jul 2025 07:01  -  03 Jul 2025 05:28  --------------------------------------------------------  IN:    IV PiggyBack: 200 mL    Lactated Ringers: 1300 mL    Oral Fluid: 900 mL  Total IN: 2400 mL    OUT:    Voided (mL): 700 mL  Total OUT: 700 mL    Total NET: 1700 mL               Discussed with:     Cultures:	        Radiology

## 2025-07-03 NOTE — DISCHARGE NOTE PROVIDER - CARE PROVIDER_API CALL
Demetrius Pugh  Internal Medicine  [address] [place] [address]  Phone: [phone]  Fax: [phone]  Established Patient  Follow Up Time: 1 week    Rojas Rodriguez  Cardiovascular Disease  [address] [place] [address]  Phone: [phone]  Fax: [phone]  Established Patient  Follow Up Time: 1 week

## 2025-07-03 NOTE — DISCHARGE NOTE NURSING/CASE MANAGEMENT/SOCIAL WORK - FINANCIAL ASSISTANCE
[facility] provides services at a reduced cost to those who are determined to be eligible through [facility]’s financial assistance program. Information regarding [facility]’s financial assistance program can be found by going to [email or web address] or by calling [phone].

## 2025-07-03 NOTE — DISCHARGE NOTE PROVIDER - HOSPITAL COURSE
This is a 60-year-old male history of chronic lymphedema of the left leg, CAD on aspirin and Plavix, hypertension, HAKAN on CPAP high cholesterol presents to the ED with complaints of redness and pain to the left lower leg noticed this morning.  Patient's wife marked the area when she was at mid calf this morning.  On arrival to the ED redness has traveled up to the distal thigh.  Patient denies any fever chills, nausea vomiting diarrhea, abdominal pain, chest pain, shortness of breath, headache dizziness lightheadedness, paresthesias or all other complaints. In ER, patient was found to be hypotensive.     Problem/Plan - 1:  ·  Problem: Other specified sepsis.   ·  Plan: Sepsis 2/2 to LLE cellulitis  F/U culture data -- NTD  Continue iv Zosyn  Trend WBC  CT LLE negative       Problem/Plan - 2:  ·  Problem: Essential hypertension.   ·  Plan: Will hold BP meds due to hypotension  Cardio f/u       Problem/Plan - 3:  ·  Problem: CAD (coronary artery disease).   ·  Plan: Continue statin, ASA.     Problem/Plan - 4:  ·  Problem: HAKAN on CPAP.   ·  Plan: Pulmonary f/u.    >35 minutes spent on discharge This is a 60-year-old male history of chronic lymphedema of the left leg, CAD on aspirin and Plavix, hypertension, HAKAN on CPAP high cholesterol presents to the ED with complaints of redness and pain to the left lower leg noticed this morning.  Patient's wife marked the area when she was at mid calf this morning.  On arrival to the ED redness has traveled up to the distal thigh.  Patient denies any fever chills, nausea vomiting diarrhea, abdominal pain, chest pain, shortness of breath, headache dizziness lightheadedness, paresthesias or all other complaints. In ER, patient was found to be hypotensive.     Problem/Plan - 1:  ·  Problem: Other specified sepsis.   ·  Plan: Sepsis 2/2 to LLE cellulitis  F/U culture data -- NTD  Treated with iv Zosyn  D/C on Vantin 200mg bid through 7/7  Trend WBC -- improved  CT LLE negative       Problem/Plan - 2:  ·  Problem: Essential hypertension.   ·  Plan: Will hold BP meds due to hypotension  Continue midodrine and titrate off as outpatient  Cardio f/u       Problem/Plan - 3:  ·  Problem: CAD (coronary artery disease).   ·  Plan: Continue statin, ASA.     Problem/Plan - 4:  ·  Problem: HAKAN on CPAP.   ·  Plan: Pulmonary f/u.    >35 minutes spent on discharge

## 2025-07-03 NOTE — PROGRESS NOTE ADULT - NS ATTEND AMEND GEN_ALL_CORE FT
60-year-old male history of chronic lymphedema of the left leg, CAD on aspirin, hypertension, high cholesterol presents to the ED with complaints of redness and pain to the left lower leg noticed this morning.  Cardiology consulted for abnormal EKG.     chronic L>R edema  now with worsened edema and cellulitis  responding to treatment  normal ef without sig valve disease  known cad without acute ishcemia  cont asa, statin  would resume bp meds as outpt if able  dc planning

## 2025-07-03 NOTE — PROGRESS NOTE ADULT - PROVIDER SPECIALTY LIST ADULT
Infectious Disease
Internal Medicine
Infectious Disease
Pulmonology
Cardiology
Cardiology
Pulmonology
Internal Medicine

## 2025-07-03 NOTE — PROGRESS NOTE ADULT - TIME BILLING
- Ordering, reviewing, and interpreting labs, testing, and imaging.  - Independently obtaining a review of systems and performing a physical exam  - Reviewing consultant documentation/recommendations in addition to discussing plan of care with consultants.  - Counselling and educating patient regarding interpretation of aforementioned items and plan of care.
- Ordering, reviewing, and interpreting labs, testing, and imaging.  - Independently obtaining a review of systems and performing a physical exam  - Reviewing consultant documentation/recommendations in addition to discussing plan of care with consultants.  - Counselling and educating patient regarding interpretation of aforementioned items and plan of care.

## 2025-07-03 NOTE — CASE MANAGEMENT PROGRESS NOTE - NSCMPROGRESSNOTE_GEN_ALL_CORE
Per MD patient is stable for transition home today. Discharge Notice reviewed, copy given to patient. Patient verbalized understanding and is in agreement with transitioning home today. No skilled needs identified. Spouse to transport patient home.

## 2025-07-03 NOTE — PROGRESS NOTE ADULT - ASSESSMENT
60-year-old male history of chronic lymphedema of the left leg, CAD on aspirin, hypertension, high cholesterol presents to the ED with complaints of redness and pain to the left lower leg noticed this morning.  Cardiology consulted for abnormal EKG.     Edema, CAD  - Euvolemic apart from LLE swelling 2/2 suspected cellulitis  - Continue to hold Torsemide currently given soft BPs  - Abx per ID  - Recent TTE from 6/25: LVEF 76%. Trace MR. No need for repeat.    - s/p PCI & EFRAÍN x1 to 70% mLAD stenosis on 07/2023.   - No clear evidence of acute ischemia; Pt denying CP & Palpitations. He had no inducible cardiac sz this past weekend while golfing.   - Troponin negative x1  - EKG: Sinus tachy. LAD. Mild ST depressions in V3-V6  - Continue Aspirin, statin.  - No anginal complaints    - EKG: NSR, unchanged from prior EKG  -off tele     HTN  - BP soft w/ improvement at systolic 100's.  s/p 3.3L IV resuscitation for sespis  - Hold home BP meds  -can resume as midodrine weaned and hemodynamics allow    - Monitor and replete lytes, keep K>4, Mg>2.  - Will continue to follow.    Tadeo Garrett DNP, AGACNP-BC  Cardiology   Call Teams

## 2025-07-03 NOTE — DISCHARGE NOTE PROVIDER - NSDCFUSCHEDAPPT_GEN_ALL_CORE_FT
Madelyn Doyle  [facility] Physician [facility]  PULMMED [address]  Scheduled Appointment: 07/09/2025    Rojas Rodriguez  [facility] Physician [facility]  CARDIOLOGY [address]  Scheduled Appointment: 08/11/2025    Demetrius Pugh  [facility] Physician [facility]  INTMED [id number]  Scheduled Appointment: 08/27/2025

## 2025-07-03 NOTE — DISCHARGE NOTE PROVIDER - PROVIDER TOKENS
PROVIDER:[TOKEN:[[id number]],FOLLOWUP:[1 week],ESTABLISHEDPATIENT:[T]],PROVIDER:[TOKEN:[[id number]],FOLLOWUP:[1 week],ESTABLISHEDPATIENT:[T]]

## 2025-07-03 NOTE — PROGRESS NOTE ADULT - PROBLEM SELECTOR PLAN 2
Will hold BP meds due to hypotension  Cardio f/u  Further work-up/management pending clinical course.
Will hold BP meds due to hypotension  Continue midodrine for now  Restart as outpatient as tolerated  Cardio f/u

## 2025-07-03 NOTE — PROGRESS NOTE ADULT - PROBLEM SELECTOR PLAN 1
Sepsis 2/2 to LLE cellulitis  F/U culture data  Continue iv Zosyn  Trend WBC  ID/surgery f/u  Further work-up/management pending clinical course.
Sepsis 2/2 to LLE cellulitis  F/U culture data -- NTD  On iv Zosyn -- change to po abx when cleared by ID  Trend WBC -- improved  ID/surgery f/u

## 2025-07-05 LAB
-  AMPICILLIN/SULBACTAM: SIGNIFICANT CHANGE UP
-  AMPICILLIN: SIGNIFICANT CHANGE UP
-  AZTREONAM: SIGNIFICANT CHANGE UP
-  CEFAZOLIN: SIGNIFICANT CHANGE UP
-  CEFEPIME: SIGNIFICANT CHANGE UP
-  CEFOXITIN: SIGNIFICANT CHANGE UP
-  CEFTRIAXONE: SIGNIFICANT CHANGE UP
-  CIPROFLOXACIN: SIGNIFICANT CHANGE UP
-  ERTAPENEM: SIGNIFICANT CHANGE UP
-  GENTAMICIN: SIGNIFICANT CHANGE UP
-  IMIPENEM: SIGNIFICANT CHANGE UP
-  LEVOFLOXACIN: SIGNIFICANT CHANGE UP
-  MEROPENEM: SIGNIFICANT CHANGE UP
-  PIPERACILLIN/TAZOBACTAM: SIGNIFICANT CHANGE UP
-  TIGECYCLINE: SIGNIFICANT CHANGE UP
-  TOBRAMYCIN: SIGNIFICANT CHANGE UP
-  TRIMETHOPRIM/SULFAMETHOXAZOLE: SIGNIFICANT CHANGE UP
CULTURE RESULTS: ABNORMAL
METHOD TYPE: SIGNIFICANT CHANGE UP
ORGANISM # SPEC MICROSCOPIC CNT: ABNORMAL
ORGANISM # SPEC MICROSCOPIC CNT: ABNORMAL
ORGANISM # SPEC MICROSCOPIC CNT: SIGNIFICANT CHANGE UP
SPECIMEN SOURCE: SIGNIFICANT CHANGE UP

## 2025-07-06 LAB
CULTURE RESULTS: SIGNIFICANT CHANGE UP
SPECIMEN SOURCE: SIGNIFICANT CHANGE UP

## 2025-07-07 ENCOUNTER — NON-APPOINTMENT (OUTPATIENT)
Age: 60
End: 2025-07-07

## 2025-07-07 ENCOUNTER — TRANSCRIPTION ENCOUNTER (OUTPATIENT)
Age: 60
End: 2025-07-07

## 2025-07-09 ENCOUNTER — APPOINTMENT (OUTPATIENT)
Dept: CARDIOLOGY | Facility: CLINIC | Age: 60
End: 2025-07-09
Payer: COMMERCIAL

## 2025-07-09 ENCOUNTER — APPOINTMENT (OUTPATIENT)
Dept: PULMONOLOGY | Facility: CLINIC | Age: 60
End: 2025-07-09
Payer: COMMERCIAL

## 2025-07-09 VITALS
SYSTOLIC BLOOD PRESSURE: 106 MMHG | HEART RATE: 47 BPM | DIASTOLIC BLOOD PRESSURE: 68 MMHG | OXYGEN SATURATION: 94 % | BODY MASS INDEX: 59.47 KG/M2 | HEIGHT: 61 IN | WEIGHT: 315 LBS

## 2025-07-09 PROBLEM — Z09 HOSPITAL DISCHARGE FOLLOW-UP: Status: ACTIVE | Noted: 2025-07-09

## 2025-07-09 PROCEDURE — 94060 EVALUATION OF WHEEZING: CPT

## 2025-07-09 PROCEDURE — 93000 ELECTROCARDIOGRAM COMPLETE: CPT

## 2025-07-09 PROCEDURE — 99205 OFFICE O/P NEW HI 60 MIN: CPT | Mod: 25

## 2025-07-09 PROCEDURE — 94618 PULMONARY STRESS TESTING: CPT

## 2025-07-09 PROCEDURE — G2211 COMPLEX E/M VISIT ADD ON: CPT

## 2025-07-09 PROCEDURE — 94727 GAS DIL/WSHOT DETER LNG VOL: CPT

## 2025-07-09 PROCEDURE — 99215 OFFICE O/P EST HI 40 MIN: CPT

## 2025-07-09 PROCEDURE — 94729 DIFFUSING CAPACITY: CPT

## 2025-07-09 RX ORDER — CEFPODOXIME PROXETIL 200 MG/1
200 TABLET, FILM COATED ORAL
Refills: 0 | Status: DISCONTINUED | COMMUNITY
Start: 2025-07-07 | End: 2025-07-09

## 2025-07-14 ENCOUNTER — APPOINTMENT (OUTPATIENT)
Dept: WOUND CARE | Facility: HOSPITAL | Age: 60
End: 2025-07-14
Payer: COMMERCIAL

## 2025-07-14 ENCOUNTER — OUTPATIENT (OUTPATIENT)
Dept: OUTPATIENT SERVICES | Facility: HOSPITAL | Age: 60
LOS: 1 days | End: 2025-07-14
Payer: COMMERCIAL

## 2025-07-14 VITALS
TEMPERATURE: 98.2 F | OXYGEN SATURATION: 93 % | WEIGHT: 315 LBS | BODY MASS INDEX: 45.1 KG/M2 | SYSTOLIC BLOOD PRESSURE: 105 MMHG | HEIGHT: 70 IN | DIASTOLIC BLOOD PRESSURE: 57 MMHG | RESPIRATION RATE: 16 BRPM | HEART RATE: 48 BPM

## 2025-07-14 DIAGNOSIS — Z90.49 ACQUIRED ABSENCE OF OTHER SPECIFIED PARTS OF DIGESTIVE TRACT: Chronic | ICD-10-CM

## 2025-07-14 DIAGNOSIS — L97.801 NON-PRESSURE CHRONIC ULCER OF OTHER PART OF UNSPECIFIED LOWER LEG LIMITED TO BREAKDOWN OF SKIN: ICD-10-CM

## 2025-07-14 PROCEDURE — G0463: CPT

## 2025-07-14 PROCEDURE — 99203 OFFICE O/P NEW LOW 30 MIN: CPT

## 2025-07-14 RX ORDER — MIDODRINE HYDROCHLORIDE 10 MG/1
10 TABLET ORAL TWICE DAILY
Qty: 180 | Refills: 0 | Status: ACTIVE | COMMUNITY
Start: 2025-07-07

## 2025-07-14 RX ORDER — SULFAMETHOXAZOLE AND TRIMETHOPRIM 800; 160 MG/1; MG/1
800-160 TABLET ORAL
Qty: 28 | Refills: 0 | Status: ACTIVE | COMMUNITY
Start: 2025-07-09

## 2025-07-14 RX ORDER — COLOSTRUM, BOVINE 400 MG
250 CAPSULE,DELAYED RELEASE (ENTERIC COATED) ORAL
Refills: 0 | Status: ACTIVE | COMMUNITY

## 2025-07-14 RX ORDER — TORSEMIDE 10 MG/1
10 TABLET ORAL
Qty: 90 | Refills: 3 | Status: ACTIVE | COMMUNITY
Start: 2025-07-09

## 2025-07-15 ENCOUNTER — NON-APPOINTMENT (OUTPATIENT)
Age: 60
End: 2025-07-15

## 2025-07-15 DIAGNOSIS — I10 ESSENTIAL (PRIMARY) HYPERTENSION: ICD-10-CM

## 2025-07-15 DIAGNOSIS — L97.821 NON-PRESSURE CHRONIC ULCER OF OTHER PART OF LEFT LOWER LEG LIMITED TO BREAKDOWN OF SKIN: ICD-10-CM

## 2025-07-15 DIAGNOSIS — I89.0 LYMPHEDEMA, NOT ELSEWHERE CLASSIFIED: ICD-10-CM

## 2025-07-15 DIAGNOSIS — Z95.5 PRESENCE OF CORONARY ANGIOPLASTY IMPLANT AND GRAFT: ICD-10-CM

## 2025-07-15 DIAGNOSIS — Z79.85 LONG-TERM (CURRENT) USE OF INJECTABLE NON-INSULIN ANTIDIABETIC DRUGS: ICD-10-CM

## 2025-07-15 DIAGNOSIS — I25.10 ATHEROSCLEROTIC HEART DISEASE OF NATIVE CORONARY ARTERY WITHOUT ANGINA PECTORIS: ICD-10-CM

## 2025-07-15 DIAGNOSIS — I83.228 VARICOSE VEINS OF LEFT LOWER EXTREMITY WITH BOTH ULCER OF OTHER PART OF LOWER EXTREMITY AND INFLAMMATION: ICD-10-CM

## 2025-07-15 DIAGNOSIS — Z79.82 LONG TERM (CURRENT) USE OF ASPIRIN: ICD-10-CM

## 2025-07-15 DIAGNOSIS — Z87.2 PERSONAL HISTORY OF DISEASES OF THE SKIN AND SUBCUTANEOUS TISSUE: ICD-10-CM

## 2025-07-15 DIAGNOSIS — Z79.899 OTHER LONG TERM (CURRENT) DRUG THERAPY: ICD-10-CM

## 2025-07-15 DIAGNOSIS — G47.33 OBSTRUCTIVE SLEEP APNEA (ADULT) (PEDIATRIC): ICD-10-CM

## 2025-07-15 DIAGNOSIS — Z87.01 PERSONAL HISTORY OF PNEUMONIA (RECURRENT): ICD-10-CM

## 2025-07-16 PROBLEM — L03.116 CELLULITIS OF LEFT LOWER LEG: Status: ACTIVE | Noted: 2025-07-07

## 2025-07-17 ENCOUNTER — APPOINTMENT (OUTPATIENT)
Dept: INTERNAL MEDICINE | Facility: CLINIC | Age: 60
End: 2025-07-17
Payer: COMMERCIAL

## 2025-07-17 VITALS
WEIGHT: 315 LBS | BODY MASS INDEX: 45.1 KG/M2 | TEMPERATURE: 98.3 F | DIASTOLIC BLOOD PRESSURE: 60 MMHG | OXYGEN SATURATION: 91 % | SYSTOLIC BLOOD PRESSURE: 94 MMHG | HEIGHT: 70 IN | HEART RATE: 47 BPM | RESPIRATION RATE: 16 BRPM

## 2025-07-17 VITALS — DIASTOLIC BLOOD PRESSURE: 56 MMHG | SYSTOLIC BLOOD PRESSURE: 90 MMHG

## 2025-07-17 PROBLEM — I95.9 HYPOTENSION: Status: ACTIVE | Noted: 2025-07-09

## 2025-07-17 PROCEDURE — 99495 TRANSJ CARE MGMT MOD F2F 14D: CPT

## 2025-07-21 ENCOUNTER — APPOINTMENT (OUTPATIENT)
Dept: WOUND CARE | Facility: HOSPITAL | Age: 60
End: 2025-07-21
Payer: COMMERCIAL

## 2025-07-21 ENCOUNTER — OUTPATIENT (OUTPATIENT)
Dept: OUTPATIENT SERVICES | Facility: HOSPITAL | Age: 60
LOS: 1 days | End: 2025-07-21
Payer: COMMERCIAL

## 2025-07-21 VITALS
TEMPERATURE: 98.2 F | HEIGHT: 70 IN | HEART RATE: 50 BPM | RESPIRATION RATE: 16 BRPM | SYSTOLIC BLOOD PRESSURE: 102 MMHG | WEIGHT: 315 LBS | DIASTOLIC BLOOD PRESSURE: 68 MMHG | OXYGEN SATURATION: 95 % | BODY MASS INDEX: 45.1 KG/M2

## 2025-07-21 DIAGNOSIS — I83.228 VARICOSE VEINS OF LEFT LOWER EXTREMITY WITH BOTH ULCER OF OTHER PART OF LOWER EXTREMITY AND INFLAMMATION: ICD-10-CM

## 2025-07-21 DIAGNOSIS — L97.821 NON-PRESSURE CHRONIC ULCER OF OTHER PART OF LEFT LOWER LEG LIMITED TO BREAKDOWN OF SKIN: ICD-10-CM

## 2025-07-21 DIAGNOSIS — Z90.49 ACQUIRED ABSENCE OF OTHER SPECIFIED PARTS OF DIGESTIVE TRACT: Chronic | ICD-10-CM

## 2025-07-21 DIAGNOSIS — L97.801 NON-PRESSURE CHRONIC ULCER OF OTHER PART OF UNSPECIFIED LOWER LEG LIMITED TO BREAKDOWN OF SKIN: ICD-10-CM

## 2025-07-21 PROCEDURE — 99213 OFFICE O/P EST LOW 20 MIN: CPT

## 2025-07-21 PROCEDURE — G0463: CPT

## 2025-07-22 DIAGNOSIS — I83.228 VARICOSE VEINS OF LEFT LOWER EXTREMITY WITH BOTH ULCER OF OTHER PART OF LOWER EXTREMITY AND INFLAMMATION: ICD-10-CM

## 2025-07-22 DIAGNOSIS — Z90.49 ACQUIRED ABSENCE OF OTHER SPECIFIED PARTS OF DIGESTIVE TRACT: ICD-10-CM

## 2025-07-22 DIAGNOSIS — Z95.5 PRESENCE OF CORONARY ANGIOPLASTY IMPLANT AND GRAFT: ICD-10-CM

## 2025-07-22 DIAGNOSIS — I10 ESSENTIAL (PRIMARY) HYPERTENSION: ICD-10-CM

## 2025-07-22 DIAGNOSIS — L97.821 NON-PRESSURE CHRONIC ULCER OF OTHER PART OF LEFT LOWER LEG LIMITED TO BREAKDOWN OF SKIN: ICD-10-CM

## 2025-07-22 DIAGNOSIS — I89.0 LYMPHEDEMA, NOT ELSEWHERE CLASSIFIED: ICD-10-CM

## 2025-07-22 DIAGNOSIS — Z87.01 PERSONAL HISTORY OF PNEUMONIA (RECURRENT): ICD-10-CM

## 2025-07-22 DIAGNOSIS — G47.33 OBSTRUCTIVE SLEEP APNEA (ADULT) (PEDIATRIC): ICD-10-CM

## 2025-07-22 DIAGNOSIS — Z79.85 LONG-TERM (CURRENT) USE OF INJECTABLE NON-INSULIN ANTIDIABETIC DRUGS: ICD-10-CM

## 2025-07-22 DIAGNOSIS — Z79.82 LONG TERM (CURRENT) USE OF ASPIRIN: ICD-10-CM

## 2025-07-22 DIAGNOSIS — I25.10 ATHEROSCLEROTIC HEART DISEASE OF NATIVE CORONARY ARTERY WITHOUT ANGINA PECTORIS: ICD-10-CM

## 2025-07-22 DIAGNOSIS — Z79.899 OTHER LONG TERM (CURRENT) DRUG THERAPY: ICD-10-CM

## 2025-07-22 DIAGNOSIS — Z87.2 PERSONAL HISTORY OF DISEASES OF THE SKIN AND SUBCUTANEOUS TISSUE: ICD-10-CM

## 2025-07-23 ENCOUNTER — OUTPATIENT (OUTPATIENT)
Dept: OUTPATIENT SERVICES | Facility: HOSPITAL | Age: 60
LOS: 1 days | End: 2025-07-23
Payer: COMMERCIAL

## 2025-07-23 DIAGNOSIS — L97.821 NON-PRESSURE CHRONIC ULCER OF OTHER PART OF LEFT LOWER LEG LIMITED TO BREAKDOWN OF SKIN: ICD-10-CM

## 2025-07-23 DIAGNOSIS — Z90.49 ACQUIRED ABSENCE OF OTHER SPECIFIED PARTS OF DIGESTIVE TRACT: Chronic | ICD-10-CM

## 2025-07-23 PROCEDURE — 93970 EXTREMITY STUDY: CPT

## 2025-07-23 PROCEDURE — 93923 UPR/LXTR ART STDY 3+ LVLS: CPT | Mod: 26

## 2025-07-23 PROCEDURE — 93923 UPR/LXTR ART STDY 3+ LVLS: CPT

## 2025-07-23 PROCEDURE — 93970 EXTREMITY STUDY: CPT | Mod: 26

## 2025-07-30 ENCOUNTER — RX RENEWAL (OUTPATIENT)
Age: 60
End: 2025-07-30

## 2025-07-30 ENCOUNTER — OUTPATIENT (OUTPATIENT)
Dept: OUTPATIENT SERVICES | Facility: HOSPITAL | Age: 60
LOS: 1 days | End: 2025-07-30
Payer: COMMERCIAL

## 2025-07-30 ENCOUNTER — APPOINTMENT (OUTPATIENT)
Dept: VASCULAR SURGERY | Facility: HOSPITAL | Age: 60
End: 2025-07-30
Payer: COMMERCIAL

## 2025-07-30 VITALS
OXYGEN SATURATION: 90 % | HEART RATE: 48 BPM | DIASTOLIC BLOOD PRESSURE: 78 MMHG | WEIGHT: 315 LBS | BODY MASS INDEX: 45.1 KG/M2 | SYSTOLIC BLOOD PRESSURE: 116 MMHG | RESPIRATION RATE: 17 BRPM | TEMPERATURE: 98.3 F | HEIGHT: 70 IN

## 2025-07-30 DIAGNOSIS — I89.0 LYMPHEDEMA, NOT ELSEWHERE CLASSIFIED: ICD-10-CM

## 2025-07-30 DIAGNOSIS — I87.2 VENOUS INSUFFICIENCY (CHRONIC) (PERIPHERAL): ICD-10-CM

## 2025-07-30 DIAGNOSIS — I10 ESSENTIAL (PRIMARY) HYPERTENSION: ICD-10-CM

## 2025-07-30 DIAGNOSIS — Z87.01 PERSONAL HISTORY OF PNEUMONIA (RECURRENT): ICD-10-CM

## 2025-07-30 DIAGNOSIS — G47.33 OBSTRUCTIVE SLEEP APNEA (ADULT) (PEDIATRIC): ICD-10-CM

## 2025-07-30 DIAGNOSIS — Z79.82 LONG TERM (CURRENT) USE OF ASPIRIN: ICD-10-CM

## 2025-07-30 DIAGNOSIS — Z79.899 OTHER LONG TERM (CURRENT) DRUG THERAPY: ICD-10-CM

## 2025-07-30 DIAGNOSIS — Z87.2 PERSONAL HISTORY OF DISEASES OF THE SKIN AND SUBCUTANEOUS TISSUE: ICD-10-CM

## 2025-07-30 DIAGNOSIS — Z79.85 LONG-TERM (CURRENT) USE OF INJECTABLE NON-INSULIN ANTIDIABETIC DRUGS: ICD-10-CM

## 2025-07-30 DIAGNOSIS — L97.821 NON-PRESSURE CHRONIC ULCER OF OTHER PART OF LEFT LOWER LEG LIMITED TO BREAKDOWN OF SKIN: ICD-10-CM

## 2025-07-30 DIAGNOSIS — Z95.5 PRESENCE OF CORONARY ANGIOPLASTY IMPLANT AND GRAFT: ICD-10-CM

## 2025-07-30 DIAGNOSIS — I25.10 ATHEROSCLEROTIC HEART DISEASE OF NATIVE CORONARY ARTERY WITHOUT ANGINA PECTORIS: ICD-10-CM

## 2025-07-30 PROCEDURE — 99203 OFFICE O/P NEW LOW 30 MIN: CPT

## 2025-07-30 PROCEDURE — G0463: CPT

## 2025-07-30 RX ORDER — ASPIRIN 81 MG/1
81 TABLET, CHEWABLE ORAL DAILY
Qty: 90 | Refills: 1 | Status: ACTIVE | COMMUNITY
Start: 2025-07-30 | End: 1900-01-01

## 2025-07-31 ENCOUNTER — APPOINTMENT (OUTPATIENT)
Dept: BARIATRICS | Facility: CLINIC | Age: 60
End: 2025-07-31
Payer: COMMERCIAL

## 2025-07-31 VITALS
OXYGEN SATURATION: 96 % | BODY MASS INDEX: 45.1 KG/M2 | DIASTOLIC BLOOD PRESSURE: 72 MMHG | HEART RATE: 46 BPM | WEIGHT: 315 LBS | SYSTOLIC BLOOD PRESSURE: 116 MMHG | HEIGHT: 70 IN | TEMPERATURE: 97.1 F

## 2025-07-31 DIAGNOSIS — R63.5 ABNORMAL WEIGHT GAIN: ICD-10-CM

## 2025-07-31 DIAGNOSIS — Z79.899 OTHER LONG TERM (CURRENT) DRUG THERAPY: ICD-10-CM

## 2025-07-31 DIAGNOSIS — Z76.89 PERSONS ENCOUNTERING HEALTH SERVICES IN OTHER SPECIFIED CIRCUMSTANCES: ICD-10-CM

## 2025-07-31 DIAGNOSIS — E66.01 MORBID (SEVERE) OBESITY DUE TO EXCESS CALORIES: ICD-10-CM

## 2025-07-31 DIAGNOSIS — R68.89 OTHER GENERAL SYMPTOMS AND SIGNS: ICD-10-CM

## 2025-07-31 PROCEDURE — 99214 OFFICE O/P EST MOD 30 MIN: CPT

## 2025-07-31 PROCEDURE — G2211 COMPLEX E/M VISIT ADD ON: CPT

## 2025-08-04 ENCOUNTER — APPOINTMENT (OUTPATIENT)
Dept: WOUND CARE | Facility: HOSPITAL | Age: 60
End: 2025-08-04

## 2025-08-07 ENCOUNTER — NON-APPOINTMENT (OUTPATIENT)
Age: 60
End: 2025-08-07

## 2025-08-09 LAB
ALBUMIN SERPL ELPH-MCNC: 3.9 G/DL
ALP BLD-CCNC: 109 U/L
ALT SERPL-CCNC: 18 U/L
ANION GAP SERPL CALC-SCNC: 11 MMOL/L
AST SERPL-CCNC: 18 U/L
BASOPHILS # BLD AUTO: 0.04 K/UL
BASOPHILS NFR BLD AUTO: 0.5 %
BILIRUB SERPL-MCNC: 0.6 MG/DL
BUN SERPL-MCNC: 18 MG/DL
CALCIUM SERPL-MCNC: 9.6 MG/DL
CHLORIDE SERPL-SCNC: 106 MMOL/L
CHOLEST SERPL-MCNC: 105 MG/DL
CO2 SERPL-SCNC: 26 MMOL/L
CREAT SERPL-MCNC: 1.05 MG/DL
EGFRCR SERPLBLD CKD-EPI 2021: 81 ML/MIN/1.73M2
EOSINOPHIL # BLD AUTO: 0.09 K/UL
EOSINOPHIL NFR BLD AUTO: 1.2 %
ESTIMATED AVERAGE GLUCOSE: 108 MG/DL
GLUCOSE SERPL-MCNC: 103 MG/DL
HBA1C MFR BLD HPLC: 5.4 %
HCT VFR BLD CALC: 41.9 %
HDLC SERPL-MCNC: 45 MG/DL
HGB BLD-MCNC: 13.2 G/DL
IMM GRANULOCYTES NFR BLD AUTO: 0.4 %
LDLC SERPL-MCNC: 49 MG/DL
LYMPHOCYTES # BLD AUTO: 1.08 K/UL
LYMPHOCYTES NFR BLD AUTO: 14 %
MAN DIFF?: NORMAL
MCHC RBC-ENTMCNC: 28.9 PG
MCHC RBC-ENTMCNC: 31.5 G/DL
MCV RBC AUTO: 91.9 FL
MONOCYTES # BLD AUTO: 0.74 K/UL
MONOCYTES NFR BLD AUTO: 9.6 %
NEUTROPHILS # BLD AUTO: 5.76 K/UL
NEUTROPHILS NFR BLD AUTO: 74.3 %
NONHDLC SERPL-MCNC: 60 MG/DL
PLATELET # BLD AUTO: 146 K/UL
POTASSIUM SERPL-SCNC: 4.2 MMOL/L
PROT SERPL-MCNC: 6.8 G/DL
PSA SERPL-MCNC: 0.77 NG/ML
RBC # BLD: 4.56 M/UL
RBC # FLD: 14.1 %
SODIUM SERPL-SCNC: 143 MMOL/L
TRIGL SERPL-MCNC: 46 MG/DL
TSH SERPL-ACNC: 2.15 UIU/ML
VIT B12 SERPL-MCNC: 368 PG/ML
WBC # FLD AUTO: 7.74 K/UL

## 2025-08-11 ENCOUNTER — APPOINTMENT (OUTPATIENT)
Dept: CARDIOLOGY | Facility: CLINIC | Age: 60
End: 2025-08-11
Payer: COMMERCIAL

## 2025-08-11 VITALS
SYSTOLIC BLOOD PRESSURE: 126 MMHG | DIASTOLIC BLOOD PRESSURE: 72 MMHG | HEART RATE: 83 BPM | HEIGHT: 70 IN | OXYGEN SATURATION: 95 %

## 2025-08-11 DIAGNOSIS — I49.3 VENTRICULAR PREMATURE DEPOLARIZATION: ICD-10-CM

## 2025-08-11 DIAGNOSIS — I50.30 UNSPECIFIED DIASTOLIC (CONGESTIVE) HEART FAILURE: ICD-10-CM

## 2025-08-11 PROCEDURE — 99214 OFFICE O/P EST MOD 30 MIN: CPT

## 2025-08-11 PROCEDURE — G2211 COMPLEX E/M VISIT ADD ON: CPT

## 2025-08-11 PROCEDURE — 93000 ELECTROCARDIOGRAM COMPLETE: CPT

## 2025-08-14 ENCOUNTER — TRANSCRIPTION ENCOUNTER (OUTPATIENT)
Age: 60
End: 2025-08-14

## 2025-08-19 ENCOUNTER — APPOINTMENT (OUTPATIENT)
Dept: INTERNAL MEDICINE | Facility: CLINIC | Age: 60
End: 2025-08-19
Payer: COMMERCIAL

## 2025-08-19 VITALS — SYSTOLIC BLOOD PRESSURE: 106 MMHG | DIASTOLIC BLOOD PRESSURE: 60 MMHG

## 2025-08-19 VITALS
BODY MASS INDEX: 45.1 KG/M2 | HEIGHT: 70 IN | HEART RATE: 68 BPM | RESPIRATION RATE: 12 BRPM | SYSTOLIC BLOOD PRESSURE: 132 MMHG | DIASTOLIC BLOOD PRESSURE: 74 MMHG | OXYGEN SATURATION: 97 % | WEIGHT: 315 LBS | TEMPERATURE: 97.5 F

## 2025-08-19 DIAGNOSIS — R29.898 OTHER SYMPTOMS AND SIGNS INVOLVING THE MUSCULOSKELETAL SYSTEM: ICD-10-CM

## 2025-08-19 DIAGNOSIS — Z00.00 ENCOUNTER FOR GENERAL ADULT MEDICAL EXAMINATION W/OUT ABNORMAL FINDINGS: ICD-10-CM

## 2025-08-19 DIAGNOSIS — Z12.11 ENCOUNTER FOR SCREENING FOR MALIGNANT NEOPLASM OF COLON: ICD-10-CM

## 2025-08-19 DIAGNOSIS — I10 ESSENTIAL (PRIMARY) HYPERTENSION: ICD-10-CM

## 2025-08-19 DIAGNOSIS — I25.10 ATHEROSCLEROTIC HEART DISEASE OF NATIVE CORONARY ARTERY W/OUT ANGINA PECTORIS: ICD-10-CM

## 2025-08-19 PROCEDURE — 99213 OFFICE O/P EST LOW 20 MIN: CPT | Mod: 25

## 2025-08-19 PROCEDURE — 99396 PREV VISIT EST AGE 40-64: CPT

## 2025-08-19 PROCEDURE — G0447 BEHAVIOR COUNSEL OBESITY 15M: CPT | Mod: 59

## 2025-08-28 ENCOUNTER — APPOINTMENT (OUTPATIENT)
Dept: BARIATRICS | Facility: CLINIC | Age: 60
End: 2025-08-28
Payer: COMMERCIAL

## 2025-08-28 VITALS
WEIGHT: 315 LBS | OXYGEN SATURATION: 97 % | TEMPERATURE: 97 F | HEIGHT: 70 IN | SYSTOLIC BLOOD PRESSURE: 118 MMHG | DIASTOLIC BLOOD PRESSURE: 78 MMHG | HEART RATE: 60 BPM | BODY MASS INDEX: 45.1 KG/M2

## 2025-08-28 DIAGNOSIS — Z79.899 OTHER LONG TERM (CURRENT) DRUG THERAPY: ICD-10-CM

## 2025-08-28 DIAGNOSIS — E66.01 MORBID (SEVERE) OBESITY DUE TO EXCESS CALORIES: ICD-10-CM

## 2025-08-28 PROCEDURE — G2211 COMPLEX E/M VISIT ADD ON: CPT

## 2025-08-28 PROCEDURE — 99214 OFFICE O/P EST MOD 30 MIN: CPT
